# Patient Record
Sex: FEMALE | Race: ASIAN | Employment: OTHER | URBAN - METROPOLITAN AREA
[De-identification: names, ages, dates, MRNs, and addresses within clinical notes are randomized per-mention and may not be internally consistent; named-entity substitution may affect disease eponyms.]

---

## 2017-01-04 ENCOUNTER — HOSPITAL ENCOUNTER (OUTPATIENT)
Dept: MAMMOGRAPHY | Age: 62
Discharge: HOME | End: 2017-01-04
Attending: INTERNAL MEDICINE
Payer: SELF-PAY

## 2017-01-04 DIAGNOSIS — Z12.31 VISIT FOR SCREENING MAMMOGRAM: ICD-10-CM

## 2017-01-04 PROCEDURE — 77067 SCR MAMMO BI INCL CAD: CPT

## 2017-09-25 ENCOUNTER — CLINICAL SUPPORT (OUTPATIENT)
Dept: FAMILY MEDICINE CLINIC | Age: 62
End: 2017-09-25

## 2017-09-25 DIAGNOSIS — Z23 ENCOUNTER FOR IMMUNIZATION: Primary | ICD-10-CM

## 2017-09-25 NOTE — PATIENT INSTRUCTIONS
Vaccine Information Statement    Influenza (Flu) Vaccine (Inactivated or Recombinant): What you need to know    Many Vaccine Information Statements are available in [place] and other languages. See www.immunize.org/vis  Hojas de Información Sobre Vacunas están disponibles en Español y en muchos otros idiomas. Visite www.immunize.org/vis    1. Why get vaccinated? Influenza (flu) is a contagious disease that spreads around the [place] every year, usually between October and May. Flu is caused by influenza viruses, and is spread mainly by coughing, sneezing, and close contact. Anyone can get flu. Flu strikes suddenly and can last several days. Symptoms vary by age, but can include:   fever/chills   sore throat   muscle aches   fatigue   cough   headache    runny or stuffy nose    Flu can also lead to pneumonia and blood infections, and cause diarrhea and seizures in children. If you have a medical condition, such as heart or lung disease, flu can make it worse. Flu is more dangerous for some people. Infants and young children, people 72years of age and older, pregnant women, and people with certain health conditions or a weakened immune system are at greatest risk. Each year thousands of people in the [facility] die from flu, and many more are hospitalized. Flu vaccine can:   keep you from getting flu,   make flu less severe if you do get it, and   keep you from spreading flu to your family and other people. 2. Inactivated and recombinant flu vaccines    A dose of flu vaccine is recommended every flu season. Children 6 months through 6years of age may need two doses during the same flu season. Everyone else needs only one dose each flu season.        Some inactivated flu vaccines contain a very small amount of a mercury-based preservative called thimerosal. Studies have not shown thimerosal in vaccines to be harmful, but flu vaccines that do not contain thimerosal are available. There is no live flu virus in flu shots. They cannot cause the flu. There are many flu viruses, and they are always changing. Each year a new flu vaccine is made to protect against three or four viruses that are likely to cause disease in the upcoming flu season. But even when the vaccine doesnt exactly match these viruses, it may still provide some protection    Flu vaccine cannot prevent:   flu that is caused by a virus not covered by the vaccine, or   illnesses that look like flu but are not. It takes about 2 weeks for protection to develop after vaccination, and protection lasts through the flu season. 3. Some people should not get this vaccine    Tell the person who is giving you the vaccine:     If you have any severe, life-threatening allergies. If you ever had a life-threatening allergic reaction after a dose of flu vaccine, or have a severe allergy to any part of this vaccine, you may be advised not to get vaccinated. Most, but not all, types of flu vaccine contain a small amount of egg protein.  If you ever had Guillain-Barré Syndrome (also called GBS). Some people with a history of GBS should not get this vaccine. This should be discussed with your doctor.  If you are not feeling well. It is usually okay to get flu vaccine when you have a mild illness, but you might be asked to come back when you feel better. 4. Risks of a vaccine reaction    With any medicine, including vaccines, there is a chance of reactions. These are usually mild and go away on their own, but serious reactions are also possible. Most people who get a flu shot do not have any problems with it.      Minor problems following a flu shot include:    soreness, redness, or swelling where the shot was given     hoarseness   sore, red or itchy eyes   cough   fever   aches   headache   itching   fatigue  If these problems occur, they usually begin soon after the shot and last 1 or 2 days. More serious problems following a flu shot can include the following:     There may be a small increased risk of Guillain-Barré Syndrome (GBS) after inactivated flu vaccine. This risk has been estimated at 1 or 2 additional cases per million people vaccinated. This is much lower than the risk of severe complications from flu, which can be prevented by flu vaccine.  Young children who get the flu shot along with pneumococcal vaccine (PCV13) and/or DTaP vaccine at the same time might be slightly more likely to have a seizure caused by fever. Ask your doctor for more information. Tell your doctor if a child who is getting flu vaccine has ever had a seizure. Problems that could happen after any injected vaccine:      People sometimes faint after a medical procedure, including vaccination. Sitting or lying down for about 15 minutes can help prevent fainting, and injuries caused by a fall. Tell your doctor if you feel dizzy, or have vision changes or ringing in the ears.  Some people get severe pain in the shoulder and have difficulty moving the arm where a shot was given. This happens very rarely.  Any medication can cause a severe allergic reaction. Such reactions from a vaccine are very rare, estimated at about 1 in a million doses, and would happen within a few minutes to a few hours after the vaccination. As with any medicine, there is a very remote chance of a vaccine causing a serious injury or death. The safety of vaccines is always being monitored. For more information, visit: www.cdc.gov/vaccinesafety/    5. What if there is a serious reaction? What should I look for?  Look for anything that concerns you, such as signs of a severe allergic reaction, very high fever, or unusual behavior.     Signs of a severe allergic reaction can include hives, swelling of the face and throat, difficulty breathing, a fast heartbeat, dizziness, and weakness - usually within a few minutes to a few hours after the vaccination. What should I do?  If you think it is a severe allergic reaction or other emergency that cant wait, call 9-1-1 and get the person to the nearest hospital. Otherwise, call your doctor.  Reactions should be reported to the Vaccine Adverse Event Reporting System (VAERS). Your doctor should file this report, or you can do it yourself through  the VAERS web site at www.vaers. [facility].gov, or by calling 3-670.760.5883. VAERS does not give medical advice. 6. The National Vaccine Injury Compensation Program    The [facility] Vaccine Injury Compensation Program (VICP) is a federal program that was created to compensate people who may have been injured by certain vaccines. Persons who believe they may have been injured by a vaccine can learn about the program and about filing a claim by calling 1-843.457.2863 or visiting the [employer] website at www.[facility].gov/vaccinecompensation. There is a time limit to file a claim for compensation. 7. How can I learn more?  Ask your healthcare provider. He or she can give you the vaccine package insert or suggest other sources of information.  Call your local or state health department.  Contact the Centers for Disease Control and Prevention (CDC):  - Call 6-531.630.5164 (1-800-CDC-INFO) or  - Visit CDCs website at www.cdc.gov/flu    Vaccine Information Statement   Inactivated Influenza Vaccine   8/7/2015  42 ASPEN Ness [id number]    Department of Health and Human Services  Centers for Disease Control and Prevention    Office Use Only

## 2017-09-28 ENCOUNTER — OFFICE VISIT (OUTPATIENT)
Dept: FAMILY MEDICINE CLINIC | Age: 62
End: 2017-09-28

## 2017-09-28 VITALS
RESPIRATION RATE: 20 BRPM | HEIGHT: 64 IN | BODY MASS INDEX: 23.9 KG/M2 | HEART RATE: 62 BPM | DIASTOLIC BLOOD PRESSURE: 82 MMHG | OXYGEN SATURATION: 96 % | TEMPERATURE: 96.7 F | SYSTOLIC BLOOD PRESSURE: 139 MMHG | WEIGHT: 140 LBS

## 2017-09-28 DIAGNOSIS — Z76.89 ENCOUNTER TO ESTABLISH CARE: Primary | ICD-10-CM

## 2017-09-28 DIAGNOSIS — Z12.11 COLON CANCER SCREENING: ICD-10-CM

## 2017-09-28 DIAGNOSIS — Z23 NEED FOR SHINGLES VACCINE: ICD-10-CM

## 2017-09-28 DIAGNOSIS — Z23 ENCOUNTER FOR IMMUNIZATION: ICD-10-CM

## 2017-09-28 LAB
BACTERIA UA POCT, BACTPOCT: NORMAL
BILIRUB UR QL STRIP: NEGATIVE
CASTS UA POCT: NEGATIVE
CLUE CELLS, CLUEPOCT: NEGATIVE
CRYSTALS UA POCT, CRYSPOCT: NEGATIVE
EPITHELIAL CELLS POCT: NORMAL
GLUCOSE UR-MCNC: NEGATIVE MG/DL
KETONES P FAST UR STRIP-MCNC: NEGATIVE MG/DL
MUCUS UA POCT, MUCPOCT: NORMAL
PH UR STRIP: 7 [PH] (ref 4.6–8)
PROT UR QL STRIP: NEGATIVE MG/DL
RBC UA POCT, RBCPOCT: 0
SP GR UR STRIP: 1.02 (ref 1–1.03)
TRICH UA POCT, TRICHPOC: NEGATIVE
UA UROBILINOGEN AMB POC: NORMAL (ref 0.2–1)
URINALYSIS CLARITY POC: CLEAR
URINALYSIS COLOR POC: YELLOW
URINE BLOOD POC: NEGATIVE
URINE CULT COMMENT, POCT: NORMAL
URINE LEUKOCYTES POC: NEGATIVE
URINE NITRITES POC: NEGATIVE
WBC UA POCT, WBCPOCT: NORMAL
YEAST UA POCT, YEASTPOC: NEGATIVE

## 2017-09-28 NOTE — PATIENT INSTRUCTIONS
Vaccine Information Statement     Tdap (Tetanus, Diphtheria, Pertussis) Vaccine: What You Need to Know    Many Vaccine Information Statements are available in Croatian and other languages. See www.immunize.org/vis. Hojas de Información Sobre Vacunas están disponibles en español y en muchos otros idiomas. Visite [email or web address]    1. Why get vaccinated? Tetanus, diphtheria, and pertussis are very serious diseases. Tdap vaccine can protect us from these diseases. And, Tdap vaccine given to pregnant women can protect  babies against pertussis. TETANUS (Lockjaw) is rare in the [facility] today. It causes painful muscle tightening and stiffness, usually all over the body.  It can lead to tightening of muscles in the head and neck so you cant open your mouth, swallow, or sometimes even breathe. Tetanus kills about 1 out of 10 people who are infected even after receiving the best medical care. DIPHTHERIA is also rare in the [facility] today. It can cause a thick coating to form in the back of the throat.  It can lead to breathing problems, heart failure, paralysis, and death. PERTUSSIS (Whooping Cough) causes severe coughing spells, which can cause difficulty breathing, vomiting, and disturbed sleep.  It can also lead to weight loss, incontinence, and rib fractures. Up to 2 in 100 adolescents and 5 in 100 adults with pertussis are hospitalized or have complications, which could include pneumonia or death. These diseases are caused by bacteria. Diphtheria and pertussis are spread from person to person through secretions from coughing or sneezing. Tetanus enters the body through cuts, scratches, or wounds. Before vaccines, as many as 200,000 cases of diphtheria, 200,000 cases of pertussis, and hundreds of cases of tetanus, were reported in the [place] each year.  Since vaccination began, reports of cases for tetanus and diphtheria have dropped by about 99% and for pertussis by about 80%. 2. Tdap vaccine    Tdap vaccine can protect adolescents and adults from tetanus, diphtheria, and pertussis. One dose of Tdap is routinely given at age 6 or 15. People who did not get Tdap at that age should get it as soon as possible. Tdap is especially important for health care professionals and anyone having close contact with a baby younger than 12 months. Pregnant women should get a dose of Tdap during every pregnancy, to protect the  from pertussis. Infants are most at risk for severe, life-threatening complications from pertussis. Another vaccine, called Td, protects against tetanus and diphtheria, but not pertussis. A Td booster should be given every 10 years. Tdap may be given as one of these boosters if you have never gotten Tdap before. Tdap may also be given after a severe cut or burn to prevent tetanus infection. Your doctor or the person giving you the vaccine can give you more information. Tdap may safely be given at the same time as other vaccines. 3. Some people should not get this vaccine     A person who has ever had a life-threatening allergic reaction after a previous dose of any diphtheria, tetanus or pertussis containing vaccine, OR has a severe allergy to any part of this vaccine, should not get Tdap vaccine. Tell the person giving the vaccine about any severe allergies.  Anyone who had coma or long repeated seizures within 7 days after a childhood dose of DTP or DTaP, or a previous dose of Tdap, should not get Tdap, unless a cause other than the vaccine was found. They can still get Td.  Talk to your doctor if you:  - have seizures or another nervous system problem,  - had severe pain or swelling after any vaccine containing diphtheria, tetanus or pertussis,   - ever had a condition called Guillain Barré Syndrome (GBS),  - arent feeling well on the day the shot is scheduled.     4. Risks    With any medicine, including vaccines, there is a chance of side effects. These are usually mild and go away on their own. Serious reactions are also possible but are rare. Most people who get Tdap vaccine do not have any problems with it. Mild Problems following Tdap  (Did not interfere with activities)   Pain where the shot was given (about 3 in 4 adolescents or 2 in 3 adults)   Redness or swelling where the shot was given (about 1 person in 5)   Mild fever of at least 100.4°F (up to about 1 in 25 adolescents or 1 in 100 adults)   Headache (about 3 or 4 people in 10)   Tiredness (about 1 person in 3 or 4)   Nausea, vomiting, diarrhea, stomach ache (up to 1 in 4 adolescents or 1 in 10 adults)   Chills,  sore joints (about 1 person in 10)   Body aches (about 1 person in 3 or 4)    Rash, swollen glands (uncommon)    Moderate Problems following Tdap  (Interfered with activities, but did not require medical attention)   Pain where the shot was given (up to 1 in 5 or 6)    Redness or swelling where the shot was given (up to about 1 in 16 adolescents or 1 in 12 adults)   Fever over 102°F (about 1 in 100 adolescents or 1 in 250 adults)   Headache (about 1 in 7 adolescents or 1 in 10 adults)   Nausea, vomiting, diarrhea, stomach ache (up to 1 or 3 people in 100)   Swelling of the entire arm where the shot was given (up to about 1 in 500). Severe Problems following Tdap  (Unable to perform usual activities; required medical attention)   Swelling, severe pain, bleeding, and redness in the arm where the shot was given (rare). Problems that could happen after any vaccine:     People sometimes faint after a medical procedure, including vaccination. Sitting or lying down for about 15 minutes can help prevent fainting, and injuries caused by a fall. Tell your doctor if you feel dizzy, or have vision changes or ringing in the ears.      Some people get severe pain in the shoulder and have difficulty moving the arm where a shot was given. This happens very rarely.  Any medication can cause a severe allergic reaction. Such reactions from a vaccine are very rare, estimated at fewer than 1 in a million doses, and would happen within a few minutes to a few hours after the vaccination. As with any medicine, there is a very remote chance of a vaccine causing a serious injury or death. The safety of vaccines is always being monitored. For more information, visit: www.cdc.gov/vaccinesafety/    5. What if there is a serious problem? What should I look for?  Look for anything that concerns you, such as signs of a severe allergic reaction, very high fever, or unusual behavior.  Signs of a severe allergic reaction can include hives, swelling of the face and throat, difficulty breathing, a fast heartbeat, dizziness, and weakness. These would usually start a few minutes to a few hours after the vaccination. What should I do?  If you think it is a severe allergic reaction or other emergency that cant wait, call 9-1-1 or get the person to the nearest hospital. Otherwise, call your doctor.  Afterward, the reaction should be reported to the Vaccine Adverse Event Reporting System (VAERS). Your doctor might file this report, or you can do it yourself through the VAERS web site at www.vaers. [facility].gov, or by calling 8-399.346.4862. VAERS does not give medical advice. 6. The National Vaccine Injury Compensation Program    The [facility] Vaccine Injury Compensation Program (VICP) is a federal program that was created to compensate people who may have been injured by certain vaccines. Persons who believe they may have been injured by a vaccine can learn about the program and about filing a claim by calling 6-641.635.3407 or visiting the [employer] website at www.[facility].gov/vaccinecompensation. There is a time limit to file a claim for compensation. 7. How can I learn more?  Ask your doctor.  He or she can give you the vaccine package insert or suggest other sources of information.  Call your local or state health department.  Contact the Centers for Disease Control and Prevention (CDC):  - Call 3-760.664.3951 (1-800-CDC-INFO) or  - Visit CDCs website at www.cdc.gov/vaccines      Vaccine Information Statement   Tdap Vaccine  (2/24/2015)  42 ASPEN Demarco [id number]    Department of Health and Human Services  Centers for Disease Control and Prevention    Office Use Only

## 2017-09-28 NOTE — MR AVS SNAPSHOT
Visit Information Date & Time Provider Department Dept. Phone Encounter #  
 9/28/2017  8:30 AM Skyler Ly NP [facility] [phone] [id number] Follow-up Instructions Return in about 1 year (around 9/28/2018). Upcoming Health Maintenance Date Due COLONOSCOPY 11/21/1974 DTaP/Tdap/Td series (1 - Tdap) 11/21/1977 PAP AKA CERVICAL CYTOLOGY 11/21/1977 ZOSTER VACCINE AGE 60> 9/21/2016 BREAST CANCER SCRN MAMMOGRAM 1/4/2019 Allergies as of 9/28/2017  Review Complete On: 9/28/2017 By: Pam Berumen LPN No Known Allergies Current Immunizations  Never Reviewed Name Date Influenza Vaccine (Quad) PF 9/25/2017  8:25 AM, 10/23/2015 Not reviewed this visit You Were Diagnosed With   
  
 Codes Comments Encounter to establish care    -  Primary ICD-10-CM: Z76.89 
ICD-9-CM: V65.8 Colon cancer screening     ICD-10-CM: Z12.11 ICD-9-CM: V76.51 Need for shingles vaccine     ICD-10-CM: N09 ICD-9-CM: V04.89 Vitals BP Pulse Temp Resp Height(growth percentile) Weight(growth percentile) 139/82 62 96.7 °F (35.9 °C) (Oral) 20 5' 4\" (1.626 m) 140 lb (63.5 kg) LMP SpO2 BMI OB Status Smoking Status (LMP Unknown) 96% 24.03 kg/m2 Postmenopausal Never Smoker Vitals History BMI and BSA Data Body Mass Index Body Surface Area 24.03 kg/m 2 1.69 m 2 Preferred Pharmacy Pharmacy Name Phone [id number]- [place], [place] - [address] [phone] Your Updated Medication List  
  
   
This list is accurate as of: 9/28/17  9:29 AM.  Always use your most recent med list.  
  
  
  
  
 AZASITE 1 % ophthalmic solution Generic drug:  azithromycin  
  
 b complex vitamins tablet Take 1 Tab by mouth daily. CENTRUM SILVER WOMEN PO Take  by mouth. ESTROVEN MAXIMUM STRENGTH 400 mcg Tab Generic drug:  Mv,Ca,Min-FA-Herbal No.157  
 Take  by mouth. FISH OIL OMEGA 3-6-9 PO Take  by mouth. fluorometholone 0.1 % ophthalmic suspension Commonly known as: FML  
  
 GLUCOSAMINE 1500 COMPLEX PO Take  by mouth. HAIR, SKIN AND NAILS-ARGAN OIL 66.7-1,666.7 mcg Cap Generic drug:  mv,iron,min-folic acid-biotin Take  by mouth. ONE DAILY WOMEN'S METABOLISM PO Take  by mouth.  
  
 polycarbophil calcium 500 mg 308 [facility] Commonly known as:  [place] Olp Take 1,000 mg by mouth daily. prednisoLONE acetate 1 % ophthalmic suspension Commonly known as:  PRED FORTE We Performed the Following AMB POC URINALYSIS DIP STICK AUTO W/ MICRO  [99753 CPT(R)] CBC WITH AUTOMATED DIFF [71589 CPT(R)] LIPID PANEL [34432 CPT(R)] METABOLIC PANEL, COMPREHENSIVE [87787 CPT(R)] REFERRAL FOR COLONOSCOPY [[id number] Custom] Comments:  
 Please evaluate patient for colon cancer screening. Prefers Dr. Nathanael Bernheim VZV AB, IGG [id number] CPT(R)] Follow-up Instructions Return in about 1 year (around 9/28/2018). Referral Information Referral ID Referred By Referred To  
  
 [id number] Nabil DOMINGO Not Available Visits Status Start Date End Date 1 New Request 9/28/17 9/28/18 If your referral has a status of pending review or denied, additional information will be sent to support the outcome of this decision. Introducing [facility]! [employer] introduces [facility] patient portal. Now you can access parts of your medical record, email your doctor's office, and request medication refills online. 1. In your internet browser, go to [email or web address] 2. Click on the First Time User? Click Here link in the Sign In box. You will see the New Member Sign Up page. 3. Enter your [facility] Access Code exactly as it appears below. You will not need to use this code after youve completed the sign-up process.  If you do not sign up before the expiration date, you must request a new code. · [employer] Access Code: [email or web address] Expires: 12/27/2017  9:29 AM 
 
4. Enter the last four digits of your Social Security Number (xxxx) and Date of Birth (mm/dd/yyyy) as indicated and click Submit. You will be taken to the next sign-up page. 5. Create a [employer] ID. This will be your [employer] login ID and cannot be changed, so think of one that is secure and easy to remember. 6. Create a [employer] password. You can change your password at any time. 7. Enter your Password Reset Question and Answer. This can be used at a later time if you forget your password. 8. Enter your e-mail address. You will receive e-mail notification when new information is available in [address] 9. Click Sign Up. You can now view and download portions of your medical record. 10. Click the Download Summary menu link to download a portable copy of your medical information. If you have questions, please visit the Frequently Asked Questions section of the [employer] website. Remember, [employer] is NOT to be used for urgent needs. For medical emergencies, dial 911. Now available from your iPhone and Android! Please provide this summary of care documentation to your next provider. Your primary care clinician is listed as Donell Roberson. If you have any questions after today's visit, please call [phone].

## 2017-09-28 NOTE — PROGRESS NOTES
HISTORY OF PRESENT ILLNESS  Karyna Farrar is a 62 y.o. female. HPI  Patient comes in today to establish care. Hx elevated cholesterol in 2015. States she walks on treadmill 5 days per week for 1 hour. Patient states she loves butter, pork belly fat. Has tried to follow low cholesterol diet recently. Had a colonoscopy in [place] 2-3 years ago, but does not have a report. Would like to have a colonoscopy scheduled here with Dr. Liliya Tafoya. Had Lasik eye surgery twice. States vision continues to change over time. States eyes getting very dry. Saw Dr. Vandana Guerrero. Had surgery to increase tears in eyes, but only helped for short period of time. No Known Allergies    Past Medical History:   Diagnosis Date    Hyperlipidemia        No past surgical history on file. Social History     Social History    Marital status:      Spouse name: N/A    Number of children: N/A    Years of education: N/A     Occupational History    Not on file. Social History Main Topics    Smoking status: Never Smoker    Smokeless tobacco: Never Used    Alcohol use No    Drug use: No    Sexual activity: Yes     Partners: Female     Other Topics Concern    Not on file     Social History Narrative       Family History   Problem Relation Age of Onset    Breast Cancer Sister        Current Outpatient Prescriptions   Medication Sig    MULTIVITS-MIN/IRON/FA/LUTEIN (CENTRUM SILVER WOMEN PO) Take  by mouth.  fluorometholone (FML) 0.1 % ophthalmic suspension     prednisoLONE acetate (PRED FORTE) 1 % ophthalmic suspension     b complex vitamins tablet Take 1 Tab by mouth daily.  AZASITE 1 % ophthalmic solution     mv,iron,min-folic acid-biotin (HAIR, SKIN & NAILS- ARGAN OIL) 66.7-1,666.7 mcg cap Take  by mouth.  MV,CA,FE,MIN/FA/GUARANA/CAFF (ONE DAILY WOMEN'S METABOLISM PO) Take  by mouth.  Mv,Ca,Min-FA-Herbal No.157 (ESTROVEN MAXIMUM STRENGTH) 400 mcg tab Take  by mouth.     GLUC LOPEZ/CHONDRO LOPEZ A/VIT C/MN (GLUCOSAMINE 1500 COMPLEX PO) Take  by mouth.  polycarbophil calcium (EQUALACTIN) 500 mg chew Take 1,000 mg by mouth daily.  OMEGA-3 FATTY ACIDS/FISH OIL (FISH OIL OMEGA 3-6-9 PO) Take  by mouth. No current facility-administered medications for this visit. Review of Systems   Constitutional: Negative for chills, diaphoresis, fever, malaise/fatigue and weight loss. HENT: Negative for congestion, ear pain, sore throat and tinnitus. Eyes: Negative for blurred vision and double vision. Respiratory: Negative for cough, sputum production, shortness of breath and wheezing. Cardiovascular: Negative for chest pain, palpitations and leg swelling. Gastrointestinal: Negative for abdominal pain, blood in stool, constipation, diarrhea, nausea and vomiting. Genitourinary: Negative for dysuria, flank pain, frequency, hematuria and urgency. Musculoskeletal: Negative for back pain, joint pain and myalgias. Skin: Negative. Neurological: Negative for dizziness, tingling, sensory change, speech change, focal weakness and headaches. Psychiatric/Behavioral: Negative for depression. The patient is not nervous/anxious and does not have insomnia. Vitals:    09/28/17 0901   BP: 139/82   Pulse: 62   Resp: 20   Temp: 96.7 °F (35.9 °C)   TempSrc: Oral   SpO2: 96%   Weight: 140 lb (63.5 kg)   Height: 5' 4\" (1.626 m)     Physical Exam   Constitutional: She is oriented to person, place, and time. Vital signs are normal. She appears well-developed and well-nourished. She is cooperative. HENT:   Right Ear: Hearing, tympanic membrane, external ear and ear canal normal.   Left Ear: Hearing, tympanic membrane, external ear and ear canal normal.   Nose: Nose normal. Right sinus exhibits no maxillary sinus tenderness and no frontal sinus tenderness. Left sinus exhibits no maxillary sinus tenderness and no frontal sinus tenderness.    Mouth/Throat: Uvula is midline, oropharynx is clear and moist and mucous membranes are normal. Mucous membranes are not pale and not dry. No oropharyngeal exudate, posterior oropharyngeal edema or posterior oropharyngeal erythema. Neck: No thyroid mass and no thyromegaly present. Cardiovascular: Normal rate, regular rhythm, S1 normal, S2 normal and normal heart sounds. No murmur heard. Pulses:       Radial pulses are 2+ on the right side, and 2+ on the left side. Dorsalis pedis pulses are 2+ on the right side, and 2+ on the left side. Posterior tibial pulses are 2+ on the right side, and 2+ on the left side. Pulmonary/Chest: Effort normal and breath sounds normal. She has no decreased breath sounds. She has no wheezes. She has no rhonchi. She has no rales. Abdominal: Soft. Normal appearance and bowel sounds are normal. There is no hepatosplenomegaly. There is no tenderness. There is no CVA tenderness. Lymphadenopathy:        Head (right side): No submental, no submandibular, no tonsillar, no preauricular and no posterior auricular adenopathy present. Head (left side): No submental, no submandibular, no tonsillar, no preauricular and no posterior auricular adenopathy present. She has no cervical adenopathy. Right: No supraclavicular adenopathy present. Left: No supraclavicular adenopathy present. Neurological: She is alert and oriented to person, place, and time. Skin: Skin is warm, dry and intact. Psychiatric: She has a normal mood and affect. Her speech is normal and behavior is normal. Thought content normal.   Vitals reviewed. ASSESSMENT and PLAN    ICD-10-CM ICD-9-CM    1. Encounter to establish care Z76.89 V65.8 CBC WITH AUTOMATED DIFF      METABOLIC PANEL, COMPREHENSIVE      LIPID PANEL      AMB POC URINALYSIS DIP STICK AUTO W/ MICRO    2. Colon cancer screening Z12.11 V76.51 REFERRAL FOR COLONOSCOPY   3. Need for shingles vaccine Z23 V04.89 VZV AB, IGG   4.  Encounter for immunization Z23 V03.89 TETANUS, DIPHTHERIA TOXOIDS AND ACELLULAR PERTUSSIS VACCINE (TDAP), IN INDIVIDS. >=7, IM     Encounter Diagnoses   Name Primary?  Encounter to establish care Yes    Colon cancer screening     Need for shingles vaccine     Encounter for immunization      Orders Placed This Encounter    Tetanus, diphtheria toxoids and acellular pertussis (TDAP) vaccine, in individuals >=7 years, IM    CBC WITH AUTOMATED DIFF    METABOLIC PANEL, COMPREHENSIVE    LIPID PANEL    VZV AB, IGG    REFERRAL FOR COLONOSCOPY    AMB POC URINALYSIS DIP STICK AUTO W/ MICRO      Diagnoses and all orders for this visit:    1. Encounter to establish care  -     CBC WITH AUTOMATED DIFF  -     METABOLIC PANEL, COMPREHENSIVE  -     LIPID PANEL  -     AMB POC URINALYSIS DIP STICK AUTO W/ MICRO     2. Colon cancer screening  -     REFERRAL FOR COLONOSCOPY    3. Need for shingles vaccine  -     VZV AB, IGG    4. Encounter for immunization  -     Tetanus, diphtheria toxoids and acellular pertussis (TDAP) vaccine, in individuals >=7 years, IM      Follow-up Disposition:  Return in about 1 year (around 9/28/2018). lab results and schedule of future lab studies reviewed with patient  reviewed diet, exercise and weight control  cardiovascular risk and specific lipid/LDL goals reviewed    I have reviewed the patient's allergies and made any necessary changes. Medical, procedural, social and family histories have been reviewed and updated as medically indicated. I have reconciled and/or revised patient medications in the EMR. I have discussed each diagnosis listed in this note with Baron Cowart and/or their family. I have discussed treatment options and the risk/benefit analysis of those options, including safe use of medications and possible medication side effects. Through the use of shared decision making we have agreed to the above plan. The patient has received an after-visit summary and questions were answered concerning future plans.      Luz Elena GUY Boogie, FNP-C    This note will not be viewable in Visst.

## 2017-09-29 LAB
ALBUMIN SERPL-MCNC: 4.3 G/DL (ref 3.6–4.8)
ALBUMIN/GLOB SERPL: 1.4 {RATIO} (ref 1.2–2.2)
ALP SERPL-CCNC: 70 IU/L (ref 39–117)
ALT SERPL-CCNC: 22 IU/L (ref 0–32)
AST SERPL-CCNC: 36 IU/L (ref 0–40)
BASOPHILS # BLD AUTO: 0 X10E3/UL (ref 0–0.2)
BASOPHILS NFR BLD AUTO: 1 %
BILIRUB SERPL-MCNC: 0.4 MG/DL (ref 0–1.2)
BUN SERPL-MCNC: 15 MG/DL (ref 8–27)
BUN/CREAT SERPL: 21 (ref 12–28)
CALCIUM SERPL-MCNC: 9.3 MG/DL (ref 8.7–10.3)
CHLORIDE SERPL-SCNC: 99 MMOL/L (ref 96–106)
CHOLEST SERPL-MCNC: 280 MG/DL (ref 100–199)
CO2 SERPL-SCNC: 26 MMOL/L (ref 18–29)
CREAT SERPL-MCNC: 0.71 MG/DL (ref 0.57–1)
EOSINOPHIL # BLD AUTO: 0.2 X10E3/UL (ref 0–0.4)
EOSINOPHIL NFR BLD AUTO: 5 %
ERYTHROCYTE [DISTWIDTH] IN BLOOD BY AUTOMATED COUNT: 14.5 % (ref 12.3–15.4)
GLOBULIN SER CALC-MCNC: 3 G/DL (ref 1.5–4.5)
GLUCOSE SERPL-MCNC: 98 MG/DL (ref 65–99)
HCT VFR BLD AUTO: 39.1 % (ref 34–46.6)
HDLC SERPL-MCNC: 126 MG/DL
HGB BLD-MCNC: 13.6 G/DL (ref 11.1–15.9)
IMM GRANULOCYTES # BLD: 0 X10E3/UL (ref 0–0.1)
IMM GRANULOCYTES NFR BLD: 0 %
INTERPRETATION, 910389: NORMAL
LDLC SERPL CALC-MCNC: 132 MG/DL (ref 0–99)
LYMPHOCYTES # BLD AUTO: 1.3 X10E3/UL (ref 0.7–3.1)
LYMPHOCYTES NFR BLD AUTO: 37 %
MCH RBC QN AUTO: 31.4 PG (ref 26.6–33)
MCHC RBC AUTO-ENTMCNC: 34.8 G/DL (ref 31.5–35.7)
MCV RBC AUTO: 90 FL (ref 79–97)
MONOCYTES # BLD AUTO: 0.3 X10E3/UL (ref 0.1–0.9)
MONOCYTES NFR BLD AUTO: 8 %
NEUTROPHILS # BLD AUTO: 1.8 X10E3/UL (ref 1.4–7)
NEUTROPHILS NFR BLD AUTO: 49 %
PLATELET # BLD AUTO: 255 X10E3/UL (ref 150–379)
POTASSIUM SERPL-SCNC: 3.8 MMOL/L (ref 3.5–5.2)
PROT SERPL-MCNC: 7.3 G/DL (ref 6–8.5)
RBC # BLD AUTO: 4.33 X10E6/UL (ref 3.77–5.28)
SODIUM SERPL-SCNC: 139 MMOL/L (ref 134–144)
TRIGL SERPL-MCNC: 111 MG/DL (ref 0–149)
VLDLC SERPL CALC-MCNC: 22 MG/DL (ref 5–40)
VZV IGG SER IA-ACNC: 634 INDEX
WBC # BLD AUTO: 3.6 X10E3/UL (ref 3.4–10.8)

## 2017-10-04 DIAGNOSIS — Z23 NEED FOR SHINGLES VACCINE: Primary | ICD-10-CM

## 2023-04-06 ENCOUNTER — TRANSCRIBE ORDER (OUTPATIENT)
Dept: SCHEDULING | Age: 68
End: 2023-04-06

## 2023-04-25 ENCOUNTER — OFFICE VISIT (OUTPATIENT)
Dept: FAMILY MEDICINE CLINIC | Age: 68
End: 2023-04-25
Payer: MEDICARE

## 2023-04-25 VITALS
HEIGHT: 63 IN | OXYGEN SATURATION: 97 % | HEART RATE: 69 BPM | RESPIRATION RATE: 12 BRPM | BODY MASS INDEX: 24.73 KG/M2 | DIASTOLIC BLOOD PRESSURE: 86 MMHG | SYSTOLIC BLOOD PRESSURE: 136 MMHG | TEMPERATURE: 98.1 F | WEIGHT: 139.6 LBS

## 2023-04-25 DIAGNOSIS — Z76.89 ENCOUNTER TO ESTABLISH CARE WITH NEW DOCTOR: ICD-10-CM

## 2023-04-25 DIAGNOSIS — Z00.00 MEDICARE ANNUAL WELLNESS VISIT, SUBSEQUENT: Primary | ICD-10-CM

## 2023-04-25 DIAGNOSIS — M54.17 SPINAL STENOSIS OF LUMBOSACRAL REGION WITH RADICULOPATHY: ICD-10-CM

## 2023-04-25 DIAGNOSIS — Z23 ENCOUNTER FOR IMMUNIZATION: ICD-10-CM

## 2023-04-25 DIAGNOSIS — Z13.820 SCREENING FOR OSTEOPOROSIS: ICD-10-CM

## 2023-04-25 DIAGNOSIS — Z13.220 SCREENING, LIPID: ICD-10-CM

## 2023-04-25 DIAGNOSIS — M48.07 SPINAL STENOSIS OF LUMBOSACRAL REGION WITH RADICULOPATHY: ICD-10-CM

## 2023-04-25 DIAGNOSIS — Z12.31 ENCOUNTER FOR SCREENING MAMMOGRAM FOR BREAST CANCER: ICD-10-CM

## 2023-04-25 DIAGNOSIS — Z78.0 POSTMENOPAUSAL: ICD-10-CM

## 2023-04-25 DIAGNOSIS — R35.1 NOCTURIA: ICD-10-CM

## 2023-04-25 PROCEDURE — G0009 ADMIN PNEUMOCOCCAL VACCINE: HCPCS | Performed by: FAMILY MEDICINE

## 2023-04-25 PROCEDURE — G0439 PPPS, SUBSEQ VISIT: HCPCS | Performed by: FAMILY MEDICINE

## 2023-04-25 PROCEDURE — 99203 OFFICE O/P NEW LOW 30 MIN: CPT | Performed by: FAMILY MEDICINE

## 2023-04-25 PROCEDURE — G8400 PT W/DXA NO RESULTS DOC: HCPCS | Performed by: FAMILY MEDICINE

## 2023-04-25 PROCEDURE — 3017F COLORECTAL CA SCREEN DOC REV: CPT | Performed by: FAMILY MEDICINE

## 2023-04-25 PROCEDURE — 1123F ACP DISCUSS/DSCN MKR DOCD: CPT | Performed by: FAMILY MEDICINE

## 2023-04-25 PROCEDURE — 1101F PT FALLS ASSESS-DOCD LE1/YR: CPT | Performed by: FAMILY MEDICINE

## 2023-04-25 PROCEDURE — G8536 NO DOC ELDER MAL SCRN: HCPCS | Performed by: FAMILY MEDICINE

## 2023-04-25 PROCEDURE — 90677 PCV20 VACCINE IM: CPT | Performed by: FAMILY MEDICINE

## 2023-04-25 PROCEDURE — 1090F PRES/ABSN URINE INCON ASSESS: CPT | Performed by: FAMILY MEDICINE

## 2023-04-25 PROCEDURE — G8420 CALC BMI NORM PARAMETERS: HCPCS | Performed by: FAMILY MEDICINE

## 2023-04-25 PROCEDURE — G8427 DOCREV CUR MEDS BY ELIG CLIN: HCPCS | Performed by: FAMILY MEDICINE

## 2023-04-25 PROCEDURE — G9899 SCRN MAM PERF RSLTS DOC: HCPCS | Performed by: FAMILY MEDICINE

## 2023-04-25 PROCEDURE — G8510 SCR DEP NEG, NO PLAN REQD: HCPCS | Performed by: FAMILY MEDICINE

## 2023-04-25 RX ORDER — DICLOFENAC SODIUM 75 MG/1
75 TABLET, DELAYED RELEASE ORAL 2 TIMES DAILY
Qty: 60 TABLET | Refills: 0 | COMMUNITY
Start: 2023-04-05 | End: 2023-05-05

## 2023-04-25 RX ORDER — METHYLPREDNISOLONE 4 MG/1
TABLET ORAL
Qty: 1 DOSE PACK | Refills: 0 | Status: SHIPPED | OUTPATIENT
Start: 2023-04-25

## 2023-04-25 RX ORDER — GABAPENTIN 100 MG/1
100 CAPSULE ORAL 2 TIMES DAILY
Qty: 60 CAPSULE | Refills: 1 | Status: SHIPPED | OUTPATIENT
Start: 2023-04-25

## 2023-04-25 NOTE — PROGRESS NOTES
Patient identified by 2 identifiers. Chief Complaint   Patient presents with    Establish Care    Hip Pain     1. Have you been to the ER, urgent care clinic since your last visit? Hospitalized since your last visit? No    2. Have you seen or consulted any other health care providers outside of the [address] [place] [address] since your last visit? Include any pap smears or colon screening. No    Verbal Order with Readback given by Augustine Wise MD for Prevnar 20. Given in left Deltoid without difficulty.

## 2023-04-25 NOTE — PROGRESS NOTES
HISTORY OF PRESENT ILLNESS  Joanie Sorenson is a 78 y.o. female. 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 . First  passed away in . He was a PHD at [employer].  at age 53 from multiple organ failure. She  remarried in . Has 2 children, son 39 and dtg 40. Lives in son is a  of a  [employer] in [facility] [place] and dtg dtg is an  lives in [place]. No other family in this area. Originally from [place]. [place]. Exercises regularly and \"works hard\". Has cut back on her exercise d/t lower back and leg pain. C/o mid to low back pain for the past couple of months. She has seen ortho and had MRI showing multilevel disc and facet degenerative change with spondylolisthesis at L4-5. There is severe canal stenosis at L4-5 with 6 mm anterolisthesis of L4 on L5. She has follow up with ortho on this Friday. She is having severe pain in the buttocks and both legs. Has tingling sensation and pain in the legs. No numbness or weakness noted. She is having increased pain at night and is keeping her awake at night. Was given diclofenac but this is not helping the pain. No previous hx of back problems noted. Pain is 5-6/10 when at its worse. Patient Active Problem List   Diagnosis Code    Hyperlipidemia E78.5       Current Outpatient Medications   Medication Sig Dispense Refill    diclofenac EC (VOLTAREN) 75 mg EC tablet Take 1 Tablet by mouth two (2) times a day. 60 Tablet 0    methylPREDNISolone (MEDROL DOSEPACK) 4 mg tablet Take as directed 1 Dose Pack 0    MULTIVITS-MIN/IRON/FA/LUTEIN (CENTRUM SILVER WOMEN PO) Take  by mouth.      b complex vitamins tablet Take 1 Tablet by mouth daily. MV,CA,FE,MIN/FA/GUARANA/CAFF (ONE DAILY WOMEN'S METABOLISM PO) Take  by mouth. GLUC LOPEZ/CHONDRO LOPEZ A/VIT C/MN (GLUCOSAMINE 1500 COMPLEX PO) Take  by mouth.      polycarbophil calcium (EQUALACTIN) 500 mg chew Take 2 Tablets by mouth daily.       OMEGA-3 FATTY ACIDS/FISH OIL (FISH OIL OMEGA 3-6-9 PO) Take  by mouth. No Known Allergies      Past Medical History:   Diagnosis Date    Hyperlipidemia        History reviewed. No pertinent surgical history. Family History   Problem Relation Age of Onset    Breast Cancer Sister        Social History     Tobacco Use    Smoking status: Never    Smokeless tobacco: Never   Substance Use Topics    Alcohol use: No        Review of Systems   Constitutional:  Negative for malaise/fatigue. HENT:  Negative for congestion. Eyes:  Negative for blurred vision. Respiratory:  Negative for cough and shortness of breath. Cardiovascular:  Negative for chest pain, palpitations and leg swelling. Gastrointestinal:  Negative for abdominal pain, constipation and heartburn. Genitourinary:  Positive for frequency (at night mostly). Negative for dysuria and urgency. Neurological:  Negative for dizziness and headaches. Endo/Heme/Allergies:  Negative for environmental allergies. Psychiatric/Behavioral:  Negative for depression. The patient does not have insomnia. Physical Exam  Vitals and nursing note reviewed. Constitutional:       Appearance: Normal appearance. She is well-developed. Comments: /86   Pulse 69   Temp 98.1 °F (36.7 °C)   Resp 12   Ht 5' 3\" (1.6 m)   Wt 139 lb 9.6 oz (63.3 kg)   LMP  (LMP Unknown)   SpO2 97%   BMI 24.73 kg/m²    HENT:      Right Ear: Tympanic membrane and ear canal normal.      Left Ear: Tympanic membrane and ear canal normal.      Nose: No mucosal edema or rhinorrhea. Neck:      Thyroid: No thyromegaly. Vascular: No carotid bruit. Cardiovascular:      Rate and Rhythm: Normal rate and regular rhythm. Pulses: Normal pulses. Heart sounds: Normal heart sounds. Pulmonary:      Effort: Pulmonary effort is normal.      Breath sounds: Normal breath sounds.    Chest:   Breasts:     Right: Normal.      Left: Normal.   Abdominal:      General: Bowel sounds are normal.      Palpations: Abdomen is soft. There is no mass. Tenderness: There is no abdominal tenderness. Musculoskeletal:         General: No swelling. Normal range of motion. Cervical back: Normal range of motion and neck supple. Lumbar back: No tenderness or bony tenderness. Normal range of motion. Negative right straight leg raise test and negative left straight leg raise test.      Right lower leg: No edema. Left lower leg: No edema. Lymphadenopathy:      Cervical: No cervical adenopathy. Upper Body:      Right upper body: No supraclavicular, axillary or pectoral adenopathy. Left upper body: No supraclavicular, axillary or pectoral adenopathy. Skin:     General: Skin is warm and dry. Neurological:      General: No focal deficit present. Mental Status: She is alert and oriented to person, place, and time. Sensory: Sensation is intact. Motor: Motor function is intact. Deep Tendon Reflexes: Reflexes are normal and symmetric. Psychiatric:         Mood and Affect: Mood normal.       ASSESSMENT and PLAN  Diagnoses and all orders for this visit:    1. Medicare annual wellness visit, subsequent    2. Encounter to establish care with new doctor    3. Spinal stenosis of lumbosacral region with radiculopathy  -     methylPREDNISolone (MEDROL DOSEPACK) 4 mg tablet; Take as directed  -     gabapentin (NEURONTIN) 100 mg capsule; Take 1 Capsule by mouth two (2) times a day. Max Daily Amount: 200 mg. Take for pain in the buttocks and legs. Instructions for exercises given and reviewed with pt. Pt also to use heat to the area 3-4 times a day over the next several days until sx are improved. Follow up with ortho as planned for Friday. 4. Nocturia  -     URINALYSIS W/MICROSCOPIC; Future    5. Screening for osteoporosis  6. Postmenopausal  -     DEXA BONE DENSITY STUDY AXIAL; Future    7. Screening, lipid  -     CBC W/O DIFF;  Future  -     LIPID PANEL; Future  -     METABOLIC PANEL, COMPREHENSIVE; Future    8. Encounter for screening mammogram for breast cancer  -     [facility] 3D JOANNE W MAMMO BI SCREENING INCL CAD; Future    9. Encounter for immunization  -     PNEUMOCOCCAL, PCV20, PREVNAR 20, (AGE 18 YRS+), IM, PF  Follow up 1 month  reviewed diet, exercise and weight control  cardiovascular risk and specific lipid/LDL goals reviewed  reviewed medications and side effects in detail    I have discussed diagnosis listed in this note with pt and/or family. I have discussed treatment plans and options and the risk/benefit analysis of those options, including safe use of medications and possible medication side effects. Through the use of shared decision making we have agreed to the above plan. The patient has received an after-visit summary and questions were answered concerning future plans and follow up. Advise pt of any urgent changes then to proceed to the ER.

## 2023-04-25 NOTE — PROGRESS NOTES
This is a Subsequent Medicare Annual Wellness Exam (AWV) (Performed 12 months after IPPE or effective date of Medicare Part B enrollment)    I have reviewed the patient's medical history in detail and updated the computerized patient record. History     Patient Active Problem List   Diagnosis Code    Hyperlipidemia E78.5       Current Outpatient Medications   Medication Sig Dispense Refill    diclofenac EC (VOLTAREN) 75 mg EC tablet Take 1 Tablet by mouth two (2) times a day. 60 Tablet 0    methylPREDNISolone (MEDROL DOSEPACK) 4 mg tablet Take as directed 1 Dose Pack 0    gabapentin (NEURONTIN) 100 mg capsule Take 1 Capsule by mouth two (2) times a day. Max Daily Amount: 200 mg. Take for pain in the buttocks and legs. 60 Capsule 1    MULTIVITS-MIN/IRON/FA/LUTEIN (CENTRUM SILVER WOMEN PO) Take  by mouth.      b complex vitamins tablet Take 1 Tablet by mouth daily. MV,CA,FE,MIN/FA/GUARANA/CAFF (ONE DAILY WOMEN'S METABOLISM PO) Take  by mouth. GLUC LOPEZ/CHONDRO LOPEZ A/VIT C/MN (GLUCOSAMINE 1500 COMPLEX PO) Take  by mouth.      polycarbophil calcium (EQUALACTIN) 500 mg chew Take 2 Tablets by mouth daily. OMEGA-3 FATTY ACIDS/FISH OIL (FISH OIL OMEGA 3-6-9 PO) Take  by mouth. No Known Allergies      Past Medical History:   Diagnosis Date    Hyperlipidemia        History reviewed. No pertinent surgical history. Family History   Problem Relation Age of Onset    Breast Cancer Sister        Social History     Tobacco Use    Smoking status: Never    Smokeless tobacco: Never   Substance Use Topics    Alcohol use: No         Depression Risk Factor Screening:     PHQ over the last two weeks    Little interest or pleasure in doing things Not at all   Feeling down, depressed or hopeless Not at all   Total Score PHQ 2 0     Alcohol Risk Factor Screening: You do not drink alcohol or very rarely. Functional Ability and Level of Safety:   Hearing Loss  Hearing is good.     Activities of Daily Living  The home contains: discussed safety equipment. Patient does total self care    Fall RiskFall Risk Assessment, last 12 mths    Able to walk? Yes   Fall in past 12 months? No     Functional Ability:   Does the patient exhibit a steady gait? yes    How long did it take the patient to get up and walk from a sitting position? seconds    Is the patient self reliant? (ie can do own laundry, meals, household chores)  yes   Does the patient handle his/her own medications? yes   Does the patient handle his/her own money? yes   Is the patients home safe (ie good lighting, handrails on stairs and bath, etc.)? yes   Did you notice or did patient express any hearing difficulties? no   Did you notice or did patient express any vision difficulties? no        Advance Care Planning:   Patient was offered the opportunity to discuss advance care planning:  yes    Does patient have an Advance Directive:  no   If no, did you provide information on Caring Connections? yes      Abuse Screen  Patient is not abused    Cognitive Screening   Evaluation of Cognitive Function:  Has your family/caregiver stated any concerns about your memory: no      Patient Care Team   Patient Care Team:  Donald Hector MD as PCP - General    Assessment/Plan   Education and counseling provided:  Are appropriate based on today's review and evaluation  End-of-Life planning (with patient's consent)    ASSESSMENT and PLAN    Medicare Annual Wellness  Continue current treatment plan. Continue annual follow up. I have discussed diagnosis listed in this note with pt and/or family. I have discussed treatment plans and options and the risk/benefit analysis of those options, including safe use of medications and possible medication side effects. Through the use of shared decision making we have agreed to the above plan. The patient has received an after-visit summary and questions were answered concerning future plans and follow up. Advise pt of any urgent changes then to proceed to the ER.

## 2023-04-27 LAB
ALBUMIN SERPL-MCNC: 3.7 G/DL (ref 3.5–5)
ALBUMIN/GLOB SERPL: 1 (ref 1.1–2.2)
ALP SERPL-CCNC: 112 U/L (ref 45–117)
ALT SERPL-CCNC: 148 U/L (ref 12–78)
ANION GAP SERPL CALC-SCNC: 3 MMOL/L (ref 5–15)
APPEARANCE UR: CLEAR
AST SERPL-CCNC: 168 U/L (ref 15–37)
BACTERIA URNS QL MICRO: NEGATIVE /HPF
BILIRUB SERPL-MCNC: 0.6 MG/DL (ref 0.2–1)
BILIRUB UR QL: NEGATIVE
BUN SERPL-MCNC: 13 MG/DL (ref 6–20)
BUN/CREAT SERPL: 21 (ref 12–20)
CALCIUM SERPL-MCNC: 8.9 MG/DL (ref 8.5–10.1)
CHLORIDE SERPL-SCNC: 102 MMOL/L (ref 97–108)
CHOLEST SERPL-MCNC: 259 MG/DL
CO2 SERPL-SCNC: 30 MMOL/L (ref 21–32)
COLOR UR: ABNORMAL
CREAT SERPL-MCNC: 0.61 MG/DL (ref 0.55–1.02)
EPITH CASTS URNS QL MICRO: ABNORMAL /LPF
ERYTHROCYTE [DISTWIDTH] IN BLOOD BY AUTOMATED COUNT: 14 % (ref 11.5–14.5)
GLOBULIN SER CALC-MCNC: 3.7 G/DL (ref 2–4)
GLUCOSE SERPL-MCNC: 143 MG/DL (ref 65–100)
GLUCOSE UR STRIP.AUTO-MCNC: 100 MG/DL
HCT VFR BLD AUTO: 42.7 % (ref 35–47)
HDLC SERPL-MCNC: 110 MG/DL
HDLC SERPL: 2.4 (ref 0–5)
HGB BLD-MCNC: 14 G/DL (ref 11.5–16)
HGB UR QL STRIP: ABNORMAL
HYALINE CASTS URNS QL MICRO: ABNORMAL /LPF (ref 0–5)
KETONES UR QL STRIP.AUTO: NEGATIVE MG/DL
LDLC SERPL CALC-MCNC: 131.2 MG/DL (ref 0–100)
LEUKOCYTE ESTERASE UR QL STRIP.AUTO: ABNORMAL
MCH RBC QN AUTO: 30.8 PG (ref 26–34)
MCHC RBC AUTO-ENTMCNC: 32.8 G/DL (ref 30–36.5)
MCV RBC AUTO: 93.8 FL (ref 80–99)
NITRITE UR QL STRIP.AUTO: NEGATIVE
NRBC # BLD: 0 K/UL (ref 0–0.01)
NRBC BLD-RTO: 0 PER 100 WBC
PH UR STRIP: 6.5 (ref 5–8)
PLATELET # BLD AUTO: 183 K/UL (ref 150–400)
PMV BLD AUTO: 10.5 FL (ref 8.9–12.9)
POTASSIUM SERPL-SCNC: 4.2 MMOL/L (ref 3.5–5.1)
PROT SERPL-MCNC: 7.4 G/DL (ref 6.4–8.2)
PROT UR STRIP-MCNC: ABNORMAL MG/DL
RBC # BLD AUTO: 4.55 M/UL (ref 3.8–5.2)
RBC #/AREA URNS HPF: ABNORMAL /HPF (ref 0–5)
SODIUM SERPL-SCNC: 135 MMOL/L (ref 136–145)
SP GR UR REFRACTOMETRY: 1.02 (ref 1–1.03)
TRIGL SERPL-MCNC: 89 MG/DL (ref ?–150)
UROBILINOGEN UR QL STRIP.AUTO: 0.2 EU/DL (ref 0.2–1)
VLDLC SERPL CALC-MCNC: 17.8 MG/DL
WBC # BLD AUTO: 9.2 K/UL (ref 3.6–11)
WBC URNS QL MICRO: ABNORMAL /HPF (ref 0–4)

## 2023-05-04 ENCOUNTER — TELEPHONE (OUTPATIENT)
Dept: FAMILY MEDICINE CLINIC | Age: 68
End: 2023-05-04

## 2023-05-09 ENCOUNTER — TRANSCRIBE ORDERS (OUTPATIENT)
Facility: HOSPITAL | Age: 68
End: 2023-05-09

## 2023-05-09 DIAGNOSIS — Z13.820 SCREENING FOR OSTEOPOROSIS: ICD-10-CM

## 2023-05-09 DIAGNOSIS — Z78.0 POSTMENOPAUSAL: Primary | ICD-10-CM

## 2023-05-09 DIAGNOSIS — Z12.31 ENCOUNTER FOR SCREENING MAMMOGRAM FOR BREAST CANCER: ICD-10-CM

## 2023-05-15 ENCOUNTER — OFFICE VISIT (OUTPATIENT)
Age: 68
End: 2023-05-15
Payer: MEDICARE

## 2023-05-15 VITALS
OXYGEN SATURATION: 97 % | HEIGHT: 63 IN | RESPIRATION RATE: 12 BRPM | TEMPERATURE: 97.1 F | BODY MASS INDEX: 24.98 KG/M2 | SYSTOLIC BLOOD PRESSURE: 148 MMHG | WEIGHT: 141 LBS | DIASTOLIC BLOOD PRESSURE: 82 MMHG | HEART RATE: 71 BPM

## 2023-05-15 DIAGNOSIS — R79.89 ELEVATED LFTS: ICD-10-CM

## 2023-05-15 DIAGNOSIS — Z79.899 ENCOUNTER FOR LONG-TERM (CURRENT) USE OF MEDICATIONS: ICD-10-CM

## 2023-05-15 DIAGNOSIS — R73.9 HYPERGLYCEMIA: ICD-10-CM

## 2023-05-15 DIAGNOSIS — R31.29 MICROHEMATURIA: ICD-10-CM

## 2023-05-15 DIAGNOSIS — E78.2 MIXED HYPERLIPIDEMIA: ICD-10-CM

## 2023-05-15 DIAGNOSIS — I10 PRIMARY HYPERTENSION: Primary | ICD-10-CM

## 2023-05-15 DIAGNOSIS — M48.07 SPINAL STENOSIS, LUMBOSACRAL REGION: ICD-10-CM

## 2023-05-15 DIAGNOSIS — M54.17 RADICULOPATHY, LUMBOSACRAL REGION: ICD-10-CM

## 2023-05-15 LAB
GLUCOSE, POC: 104 MG/DL
HBA1C MFR BLD: 5.8 %

## 2023-05-15 PROCEDURE — 3078F DIAST BP <80 MM HG: CPT | Performed by: FAMILY MEDICINE

## 2023-05-15 PROCEDURE — 3077F SYST BP >= 140 MM HG: CPT | Performed by: FAMILY MEDICINE

## 2023-05-15 PROCEDURE — 1036F TOBACCO NON-USER: CPT | Performed by: FAMILY MEDICINE

## 2023-05-15 PROCEDURE — 1090F PRES/ABSN URINE INCON ASSESS: CPT | Performed by: FAMILY MEDICINE

## 2023-05-15 PROCEDURE — G8400 PT W/DXA NO RESULTS DOC: HCPCS | Performed by: FAMILY MEDICINE

## 2023-05-15 PROCEDURE — 3017F COLORECTAL CA SCREEN DOC REV: CPT | Performed by: FAMILY MEDICINE

## 2023-05-15 PROCEDURE — 83036 HEMOGLOBIN GLYCOSYLATED A1C: CPT | Performed by: FAMILY MEDICINE

## 2023-05-15 PROCEDURE — G8427 DOCREV CUR MEDS BY ELIG CLIN: HCPCS | Performed by: FAMILY MEDICINE

## 2023-05-15 PROCEDURE — 82962 GLUCOSE BLOOD TEST: CPT | Performed by: FAMILY MEDICINE

## 2023-05-15 PROCEDURE — PBSHW AMB POC HEMOGLOBIN A1C: Performed by: FAMILY MEDICINE

## 2023-05-15 PROCEDURE — 99213 OFFICE O/P EST LOW 20 MIN: CPT | Performed by: FAMILY MEDICINE

## 2023-05-15 PROCEDURE — 1123F ACP DISCUSS/DSCN MKR DOCD: CPT | Performed by: FAMILY MEDICINE

## 2023-05-15 PROCEDURE — PBSHW AMB POC GLUCOSE BLOOD, BY GLUCOSE MONITORING DEVICE: Performed by: FAMILY MEDICINE

## 2023-05-15 PROCEDURE — G8420 CALC BMI NORM PARAMETERS: HCPCS | Performed by: FAMILY MEDICINE

## 2023-05-15 RX ORDER — AMLODIPINE BESYLATE 2.5 MG/1
2.5 TABLET ORAL DAILY
Qty: 30 TABLET | Refills: 3 | Status: SHIPPED | OUTPATIENT
Start: 2023-05-15 | End: 2023-05-15 | Stop reason: SDUPTHER

## 2023-05-15 RX ORDER — AMLODIPINE BESYLATE 2.5 MG/1
2.5 TABLET ORAL DAILY
Qty: 30 TABLET | Refills: 3 | Status: SHIPPED | OUTPATIENT
Start: 2023-05-15

## 2023-05-15 RX ORDER — OMEGA-3/DHA/EPA/FISH OIL 300-1000MG
1 CAPSULE ORAL 2 TIMES DAILY
COMMUNITY

## 2023-05-15 SDOH — ECONOMIC STABILITY: HOUSING INSECURITY
IN THE LAST 12 MONTHS, WAS THERE A TIME WHEN YOU DID NOT HAVE A STEADY PLACE TO SLEEP OR SLEPT IN A SHELTER (INCLUDING NOW)?: NO

## 2023-05-15 SDOH — ECONOMIC STABILITY: INCOME INSECURITY: HOW HARD IS IT FOR YOU TO PAY FOR THE VERY BASICS LIKE FOOD, HOUSING, MEDICAL CARE, AND HEATING?: NOT HARD AT ALL

## 2023-05-15 SDOH — ECONOMIC STABILITY: FOOD INSECURITY: WITHIN THE PAST 12 MONTHS, THE FOOD YOU BOUGHT JUST DIDN'T LAST AND YOU DIDN'T HAVE MONEY TO GET MORE.: NEVER TRUE

## 2023-05-15 SDOH — ECONOMIC STABILITY: FOOD INSECURITY: WITHIN THE PAST 12 MONTHS, YOU WORRIED THAT YOUR FOOD WOULD RUN OUT BEFORE YOU GOT MONEY TO BUY MORE.: NEVER TRUE

## 2023-05-15 ASSESSMENT — PATIENT HEALTH QUESTIONNAIRE - PHQ9
1. LITTLE INTEREST OR PLEASURE IN DOING THINGS: 0
SUM OF ALL RESPONSES TO PHQ QUESTIONS 1-9: 0
SUM OF ALL RESPONSES TO PHQ9 QUESTIONS 1 & 2: 0
SUM OF ALL RESPONSES TO PHQ QUESTIONS 1-9: 0
2. FEELING DOWN, DEPRESSED OR HOPELESS: 0
SUM OF ALL RESPONSES TO PHQ QUESTIONS 1-9: 0
SUM OF ALL RESPONSES TO PHQ QUESTIONS 1-9: 0

## 2023-05-15 ASSESSMENT — ENCOUNTER SYMPTOMS
CHEST TIGHTNESS: 0
COUGH: 0
RHINORRHEA: 0
CONSTIPATION: 0
BLOOD IN STOOL: 0
SHORTNESS OF BREATH: 0
BACK PAIN: 1
ABDOMINAL PAIN: 0

## 2023-05-15 NOTE — PROGRESS NOTES
Subjective:      Patient ID: Az Womack is a 78 y.o. female. HPI  Follow up on abnormal labs and elevated blood pressure from her last visit. She had noted elevated LFTs and microhematuria. She denies any sx of abd pain or seeing blood in the urine. Previous hx negative. She does not drink alcoholic beverages. She also had follow up with ortho for her lower back pain dt/ DD and spinal stenosis. She is planning for surgery for July. Patient here for follow-up of elevated blood pressure. She is not exercising due to her back pain and is adherent to a low-salt diet. Patient denies: chest pain, dyspnea, fatigue, lower extremity edema, near-syncope, and palpitations. Cardiovascular risk factors: advanced age (older than 54 for men, 72 for women). Use of agents associated with hypertension: none. History of target organ damage: none. Past Medical History:   Diagnosis Date    Hyperlipidemia    No past surgical history on file. No current outpatient medications on file. No current facility-administered medications for this visit. Family History   Problem Relation Age of Onset    Breast Cancer Sister       Social History     Socioeconomic History    Marital status:    Tobacco Use    Smoking status: Never    Smokeless tobacco: Never   Substance and Sexual Activity    Alcohol use: No    Drug use: No   Social History Narrative    Owns restaurant. . 2 adult children who live in [place].     Social Determinants of Health     Financial Resource Strain: Low Risk     Difficulty of Paying Living Expenses: Not hard at all   Food Insecurity: No Food Insecurity    Worried About Running Out of Food in the Last Year: Never true    Ran Out of Food in the Last Year: Never true   Physical Activity: Unknown    Days of Exercise per Week: Patient refused   Intimate Partner Violence: Not At Risk    Fear of Current or Ex-Partner: No    Emotionally Abused: No    Physically Abused:  No

## 2023-05-15 NOTE — PROGRESS NOTES
Patient identified by 2 indentifiers  Chief Complaint   Patient presents with    Follow-up    Diabetes     1. Have you been to the ER, urgent care clinic since your last visit? Hospitalized since your last visit? Yes [facility]    2. Have you seen or consulted any other health care providers outside of the [address] [place] [address] since your last visit? Include any pap smears or colon screening.  No

## 2023-05-16 ENCOUNTER — TELEPHONE (OUTPATIENT)
Age: 68
End: 2023-05-16

## 2023-05-16 LAB
ALBUMIN SERPL-MCNC: 3.6 G/DL (ref 3.5–5)
ALBUMIN/GLOB SERPL: 1 (ref 1.1–2.2)
ALP SERPL-CCNC: 86 U/L (ref 45–117)
ALT SERPL-CCNC: 88 U/L (ref 12–78)
APPEARANCE UR: CLEAR
AST SERPL-CCNC: 41 U/L (ref 15–37)
BACTERIA URNS QL MICRO: NEGATIVE /HPF
BILIRUB DIRECT SERPL-MCNC: 0.1 MG/DL (ref 0–0.2)
BILIRUB SERPL-MCNC: 0.5 MG/DL (ref 0.2–1)
BILIRUB UR QL: NEGATIVE
COLOR UR: NORMAL
EPITH CASTS URNS QL MICRO: NORMAL /LPF
GLOBULIN SER CALC-MCNC: 3.5 G/DL (ref 2–4)
GLUCOSE UR STRIP.AUTO-MCNC: NEGATIVE MG/DL
HGB UR QL STRIP: NEGATIVE
HYALINE CASTS URNS QL MICRO: NORMAL /LPF (ref 0–5)
KETONES UR QL STRIP.AUTO: NEGATIVE MG/DL
LEUKOCYTE ESTERASE UR QL STRIP.AUTO: NEGATIVE
NITRITE UR QL STRIP.AUTO: NEGATIVE
PH UR STRIP: 6 (ref 5–8)
PROT SERPL-MCNC: 7.1 G/DL (ref 6.4–8.2)
PROT UR STRIP-MCNC: NEGATIVE MG/DL
RBC #/AREA URNS HPF: NORMAL /HPF (ref 0–5)
SP GR UR REFRACTOMETRY: 1.01 (ref 1–1.03)
UROBILINOGEN UR QL STRIP.AUTO: 0.2 EU/DL (ref 0.2–1)
WBC URNS QL MICRO: NORMAL /HPF (ref 0–4)

## 2023-05-16 NOTE — TELEPHONE ENCOUNTER
----- Message from Taiwo Valverde sent at 5/15/2023  3:12 PM EDT -----  Subject: Message to Provider    QUESTIONS  Information for Provider? pt called and ask that [facility] call her back about   appt date and time   ---------------------------------------------------------------------------  --------------  Nilo Rogel INFO  [phone]; OK to leave message on voicemail  ---------------------------------------------------------------------------  --------------  SCRIPT ANSWERS  Relationship to Patient?  Self

## 2023-05-17 LAB
BACTERIA SPEC CULT: NORMAL
SERVICE CMNT-IMP: NORMAL

## 2023-05-18 ENCOUNTER — TELEPHONE (OUTPATIENT)
Age: 68
End: 2023-05-18

## 2023-05-18 DIAGNOSIS — R79.89 ELEVATED LFTS: Primary | ICD-10-CM

## 2023-05-25 RX ORDER — GABAPENTIN 100 MG/1
100 CAPSULE ORAL 2 TIMES DAILY
COMMUNITY
Start: 2023-04-25 | End: 2023-05-30

## 2023-05-30 ENCOUNTER — HOSPITAL ENCOUNTER (OUTPATIENT)
Facility: HOSPITAL | Age: 68
Discharge: HOME | End: 2023-06-02
Payer: MEDICARE

## 2023-05-30 VITALS
RESPIRATION RATE: 16 BRPM | TEMPERATURE: 97.3 F | HEIGHT: 63 IN | BODY MASS INDEX: 24.8 KG/M2 | WEIGHT: 139.99 LBS | OXYGEN SATURATION: 96 % | HEART RATE: 69 BPM

## 2023-05-30 LAB
25(OH)D3 SERPL-MCNC: 49.9 NG/ML (ref 30–100)
ABO + RH BLD: NORMAL
ALBUMIN SERPL-MCNC: 4 G/DL (ref 3.5–5)
ALBUMIN/GLOB SERPL: 1 (ref 1.1–2.2)
ALP SERPL-CCNC: 97 U/L (ref 45–117)
ALT SERPL-CCNC: 58 U/L (ref 12–78)
ANION GAP SERPL CALC-SCNC: 6 MMOL/L (ref 5–15)
APPEARANCE UR: CLEAR
APTT PPP: 25.1 SEC (ref 22.1–31)
AST SERPL-CCNC: 36 U/L (ref 15–37)
BACTERIA URNS QL MICRO: NEGATIVE /HPF
BASOPHILS # BLD: 0.1 K/UL (ref 0–0.1)
BASOPHILS NFR BLD: 1 % (ref 0–1)
BILIRUB SERPL-MCNC: 0.5 MG/DL (ref 0.2–1)
BILIRUB UR QL: NEGATIVE
BLOOD GROUP ANTIBODIES SERPL: NORMAL
BUN SERPL-MCNC: 14 MG/DL (ref 6–20)
BUN/CREAT SERPL: 22 (ref 12–20)
CALCIUM SERPL-MCNC: 9.3 MG/DL (ref 8.5–10.1)
CHLORIDE SERPL-SCNC: 103 MMOL/L (ref 97–108)
CO2 SERPL-SCNC: 29 MMOL/L (ref 21–32)
COLOR UR: NORMAL
CREAT SERPL-MCNC: 0.63 MG/DL (ref 0.55–1.02)
DIFFERENTIAL METHOD BLD: NORMAL
EOSINOPHIL # BLD: 0.2 K/UL (ref 0–0.4)
EOSINOPHIL NFR BLD: 4 % (ref 0–7)
EPITH CASTS URNS QL MICRO: NORMAL /LPF
ERYTHROCYTE [DISTWIDTH] IN BLOOD BY AUTOMATED COUNT: 14 % (ref 11.5–14.5)
EST. AVERAGE GLUCOSE BLD GHB EST-MCNC: 126 MG/DL
GLOBULIN SER CALC-MCNC: 4 G/DL (ref 2–4)
GLUCOSE SERPL-MCNC: 113 MG/DL (ref 65–100)
GLUCOSE UR STRIP.AUTO-MCNC: NEGATIVE MG/DL
HBA1C MFR BLD: 6 % (ref 4–5.6)
HCT VFR BLD AUTO: 44.3 % (ref 35–47)
HGB BLD-MCNC: 14.7 G/DL (ref 11.5–16)
HGB UR QL STRIP: NEGATIVE
IMM GRANULOCYTES # BLD AUTO: 0 K/UL (ref 0–0.04)
IMM GRANULOCYTES NFR BLD AUTO: 0 % (ref 0–0.5)
INR PPP: 0.9 (ref 0.9–1.1)
KETONES UR QL STRIP.AUTO: NEGATIVE MG/DL
LEUKOCYTE ESTERASE UR QL STRIP.AUTO: NEGATIVE
LYMPHOCYTES # BLD: 1.4 K/UL (ref 0.8–3.5)
LYMPHOCYTES NFR BLD: 32 % (ref 12–49)
MCH RBC QN AUTO: 30.3 PG (ref 26–34)
MCHC RBC AUTO-ENTMCNC: 33.2 G/DL (ref 30–36.5)
MCV RBC AUTO: 91.3 FL (ref 80–99)
MONOCYTES # BLD: 0.4 K/UL (ref 0–1)
MONOCYTES NFR BLD: 9 % (ref 5–13)
NEUTS SEG # BLD: 2.3 K/UL (ref 1.8–8)
NEUTS SEG NFR BLD: 54 % (ref 32–75)
NITRITE UR QL STRIP.AUTO: NEGATIVE
NRBC # BLD: 0 K/UL (ref 0–0.01)
NRBC BLD-RTO: 0 PER 100 WBC
PH UR STRIP: 7 (ref 5–8)
PLATELET # BLD AUTO: 238 K/UL (ref 150–400)
PMV BLD AUTO: 9.4 FL (ref 8.9–12.9)
POTASSIUM SERPL-SCNC: 3.9 MMOL/L (ref 3.5–5.1)
PROT SERPL-MCNC: 8 G/DL (ref 6.4–8.2)
PROT UR STRIP-MCNC: NEGATIVE MG/DL
PROTHROMBIN TIME: 9.8 SEC (ref 9–11.1)
RBC # BLD AUTO: 4.85 M/UL (ref 3.8–5.2)
RBC #/AREA URNS HPF: NORMAL /HPF (ref 0–5)
SODIUM SERPL-SCNC: 138 MMOL/L (ref 136–145)
SP GR UR REFRACTOMETRY: 1.01 (ref 1–1.03)
SPECIMEN EXP DATE BLD: NORMAL
THERAPEUTIC RANGE: NORMAL SECS (ref 58–77)
URINE CULTURE IF INDICATED: NORMAL
UROBILINOGEN UR QL STRIP.AUTO: 0.2 EU/DL (ref 0.2–1)
WBC # BLD AUTO: 4.3 K/UL (ref 3.6–11)
WBC URNS QL MICRO: NORMAL /HPF (ref 0–4)

## 2023-05-30 PROCEDURE — 85730 THROMBOPLASTIN TIME PARTIAL: CPT

## 2023-05-30 PROCEDURE — 83036 HEMOGLOBIN GLYCOSYLATED A1C: CPT

## 2023-05-30 PROCEDURE — 82306 VITAMIN D 25 HYDROXY: CPT

## 2023-05-30 PROCEDURE — 80053 COMPREHEN METABOLIC PANEL: CPT

## 2023-05-30 PROCEDURE — 85610 PROTHROMBIN TIME: CPT

## 2023-05-30 PROCEDURE — 86901 BLOOD TYPING SEROLOGIC RH(D): CPT

## 2023-05-30 PROCEDURE — 36415 COLL VENOUS BLD VENIPUNCTURE: CPT

## 2023-05-30 PROCEDURE — 86900 BLOOD TYPING SEROLOGIC ABO: CPT

## 2023-05-30 PROCEDURE — 81001 URINALYSIS AUTO W/SCOPE: CPT

## 2023-05-30 PROCEDURE — 86850 RBC ANTIBODY SCREEN: CPT

## 2023-05-30 PROCEDURE — 85025 COMPLETE CBC W/AUTO DIFF WBC: CPT

## 2023-05-30 RX ORDER — ACETAMINOPHEN 160 MG
1 TABLET,DISINTEGRATING ORAL DAILY
COMMUNITY

## 2023-05-30 RX ORDER — MULTIVITAMIN WITH IRON
250 TABLET ORAL DAILY
COMMUNITY

## 2023-05-30 RX ORDER — OMEGA-3 FATTY ACIDS CAP DELAYED RELEASE 1000 MG 1000 MG
3000 CAPSULE DELAYED RELEASE ORAL
COMMUNITY

## 2023-05-30 RX ORDER — AMLODIPINE BESYLATE 5 MG/1
5 TABLET ORAL DAILY
COMMUNITY

## 2023-05-30 RX ORDER — M-VIT,TX,IRON,MINS/CALC/FOLIC 27MG-0.4MG
1 TABLET ORAL DAILY
COMMUNITY
End: 2023-05-30

## 2023-05-30 ASSESSMENT — ENCOUNTER SYMPTOMS
WHEEZING: 0
VOMITING: 0
TROUBLE SWALLOWING: 0
NAUSEA: 0
BACK PAIN: 1
SHORTNESS OF BREATH: 0
SORE THROAT: 0
BLOOD IN STOOL: 0
COUGH: 0
ABDOMINAL PAIN: 0

## 2023-05-30 NOTE — H&P
Sue Machuca was referred for evaluation by:Dr. Amaury Alcazar for Pre- Op Evaluation. Please see encounter details and orders for consultative summary. Type of surgery : L4-L5 Lumbar laminectomy, Fusion, Instrumentation, TLIF, Bone graft  Surgery site : Lumbar spine  Anesthesia type: General  Date of procedure:  6/13/2023    This 78y.o. year old female presents with complaints of lower back pain with pain radiating down both legs for the past 6+ months. Conservative measures including medication management, LISSET have been exhausted prior to the decision for surgery. Patient has discussed the risks, alternatives, and benefits of the surgery with surgeon and has elected to proceed with surgical intervention. Has recently been started on Amlodipine 2.5 mg daily for hypertension but did not notice any improvement so she increased dose to 2 tabs daily after speaking to a physician friend. Has only noticed a slight difference in her blood pressures since increasing dose.     Allergies: No Known Allergies  Latex allergy: no  Prior reactions to anesthesia:  None     Current Outpatient Medications   Medication Sig    NONFORMULARY 1 tablet daily Mussel 2000    magnesium (MAGNESIUM-OXIDE) 250 MG TABS tablet Take 1 tablet by mouth daily    Cholecalciferol (VITAMIN D3) 50 MCG (2000 UT) CAPS Take 1 capsule by mouth daily    Omega-3 Fatty Acids (FISH OIL) 1000 MG CPDR Take 3 capsules by mouth    CRANBERRY PO Take 2 tablets by mouth daily 650 mg    CALCIUM PO Take 1 tablet by mouth daily citracal    Multiple Vitamins-Minerals (PRESERVISION AREDS PO) Take 2 tablets by mouth daily    Cyanocobalamin (VITAMIN B12 PO) Take 1 tablet by mouth daily 2500 mg    TURMERIC PO Take 2 tablets by mouth daily    NONFORMULARY 1 tablet daily estroven    NONFORMULARY 2 tablets daily Hair skin and nails vitamin    Ascorbic Acid (VITAMIN C PO) Take 1 tablet by mouth daily    amLODIPine (NORVASC) 5 MG tablet Take 1 tablet by mouth daily    B COMPLEX

## 2023-05-30 NOTE — PERIOP NOTE
[address] , [address] [facility]   MAIN OR                                  [phone]   MAIN PRE OP                          [phone]                                                                                AMBULATORY PRE OP          [phone]  PRE-ADMISSION TESTING    [phone]   Surgery Date:  Tuesday 6/13/23       Is surgery arrival time given by surgeon? YES  NO  If NO, [facility] staff will call you between 3 and 7pm the day before your surgery with your arrival time. (If your surgery is on a Monday, we will call you the Friday before.)    Call [phone] after 7pm Monday-Friday if you did not receive this call. INSTRUCTIONS BEFORE YOUR SURGERY   When You  Arrive Arrive at the [address]nd [address] [facility] on the day of your surgery  Have your insurance card, photo ID, and any copayment (if needed)   Food   and   Drink NO food or drink after midnight the night before surgery    This means NO water, gum, mints, coffee, juice, etc.  No alcohol (beer, wine, liquor) 24 hours before and after surgery   Medications to   TAKE   Morning of Surgery MEDICATIONS TO TAKE THE MORNING OF SURGERY WITH A SIP OF WATER:   amlodipine   Medications  To  STOP      7 days before surgery Non-Steroidal anti-inflammatory Drugs (NSAID's): for example, Ibuprofen (Advil, Motrin), Naproxen (Aleve)  Aspirin, if taking for pain   Herbal supplements, vitamins, and fish oil  Other:  (Pain medications not listed above, including Tylenol may be taken)   Blood  Thinners If you take  Aspirin, Plavix, Coumadin, or any blood-thinning or anti-blood clot medicine, talk to the doctor who prescribed the medications for pre-operative instructions.    Bathing Clothing  Jewelry  Valuables     If you shower the morning of surgery, please do not apply anything to your skin (lotions, powders, deodorant, or makeup, especially mascara)  Follow Chlorhex[facility] [employer]

## 2023-05-31 LAB
BACTERIA SPEC CULT: NORMAL
BACTERIA SPEC CULT: NORMAL
EKG ATRIAL RATE: 62 BPM
EKG DIAGNOSIS: NORMAL
EKG P AXIS: 13 DEGREES
EKG P-R INTERVAL: 152 MS
EKG Q-T INTERVAL: 458 MS
EKG QRS DURATION: 150 MS
EKG QTC CALCULATION (BAZETT): 464 MS
EKG R AXIS: -2 DEGREES
EKG T AXIS: 12 DEGREES
EKG VENTRICULAR RATE: 62 BPM
SERVICE CMNT-IMP: NORMAL

## 2023-06-05 ENCOUNTER — HOSPITAL ENCOUNTER (OUTPATIENT)
Facility: HOSPITAL | Age: 68
Discharge: HOME | End: 2023-06-08
Payer: MEDICARE

## 2023-06-05 DIAGNOSIS — R79.89 ELEVATED LFTS: ICD-10-CM

## 2023-06-05 PROCEDURE — 76705 ECHO EXAM OF ABDOMEN: CPT

## 2023-06-13 ENCOUNTER — HOSPITAL ENCOUNTER (INPATIENT)
Facility: HOSPITAL | Age: 68
LOS: 2 days | Discharge: HOME | DRG: 455 | End: 2023-06-15
Attending: ORTHOPAEDIC SURGERY | Admitting: ORTHOPAEDIC SURGERY
Payer: MEDICARE

## 2023-06-13 ENCOUNTER — APPOINTMENT (OUTPATIENT)
Facility: HOSPITAL | Age: 68
DRG: 455 | End: 2023-06-13
Attending: ORTHOPAEDIC SURGERY
Payer: MEDICARE

## 2023-06-13 DIAGNOSIS — M48.062 LUMBAR STENOSIS WITH NEUROGENIC CLAUDICATION: Primary | ICD-10-CM

## 2023-06-13 PROCEDURE — 2500000003 HC RX 250 WO HCPCS: Performed by: ORTHOPAEDIC SURGERY

## 2023-06-13 PROCEDURE — 0SG0071 FUSION OF LUMBAR VERTEBRAL JOINT WITH AUTOLOGOUS TISSUE SUBSTITUTE, POSTERIOR APPROACH, POSTERIOR COLUMN, OPEN APPROACH: ICD-10-PCS | Performed by: ORTHOPAEDIC SURGERY

## 2023-06-13 PROCEDURE — 3700000001 HC ADD 15 MINUTES (ANESTHESIA): Performed by: ORTHOPAEDIC SURGERY

## 2023-06-13 PROCEDURE — 2580000003 HC RX 258: Performed by: ORTHOPAEDIC SURGERY

## 2023-06-13 PROCEDURE — C9290 INJ, BUPIVACAINE LIPOSOME: HCPCS | Performed by: ORTHOPAEDIC SURGERY

## 2023-06-13 PROCEDURE — 2720000010 HC SURG SUPPLY STERILE: Performed by: ORTHOPAEDIC SURGERY

## 2023-06-13 PROCEDURE — 0SB20ZZ EXCISION OF LUMBAR VERTEBRAL DISC, OPEN APPROACH: ICD-10-PCS | Performed by: ORTHOPAEDIC SURGERY

## 2023-06-13 PROCEDURE — 6360000002 HC RX W HCPCS: Performed by: ORTHOPAEDIC SURGERY

## 2023-06-13 PROCEDURE — 3600000014 HC SURGERY LEVEL 4 ADDTL 15MIN: Performed by: ORTHOPAEDIC SURGERY

## 2023-06-13 PROCEDURE — 6370000000 HC RX 637 (ALT 250 FOR IP): Performed by: ORTHOPAEDIC SURGERY

## 2023-06-13 PROCEDURE — 1100000000 HC RM PRIVATE

## 2023-06-13 PROCEDURE — 6370000000 HC RX 637 (ALT 250 FOR IP): Performed by: ANESTHESIOLOGY

## 2023-06-13 PROCEDURE — 94761 N-INVAS EAR/PLS OXIMETRY MLT: CPT

## 2023-06-13 PROCEDURE — 7100000001 HC PACU RECOVERY - ADDTL 15 MIN: Performed by: ORTHOPAEDIC SURGERY

## 2023-06-13 PROCEDURE — C1762 CONN TISS, HUMAN(INC FASCIA): HCPCS | Performed by: ORTHOPAEDIC SURGERY

## 2023-06-13 PROCEDURE — 7100000000 HC PACU RECOVERY - FIRST 15 MIN: Performed by: ORTHOPAEDIC SURGERY

## 2023-06-13 PROCEDURE — 3600000004 HC SURGERY LEVEL 4 BASE: Performed by: ORTHOPAEDIC SURGERY

## 2023-06-13 PROCEDURE — C1889 IMPLANT/INSERT DEVICE, NOC: HCPCS | Performed by: ORTHOPAEDIC SURGERY

## 2023-06-13 PROCEDURE — 0SG00AJ FUSION OF LUMBAR VERTEBRAL JOINT WITH INTERBODY FUSION DEVICE, POSTERIOR APPROACH, ANTERIOR COLUMN, OPEN APPROACH: ICD-10-PCS | Performed by: ORTHOPAEDIC SURGERY

## 2023-06-13 PROCEDURE — 4A11X4G MONITORING OF PERIPHERAL NERVOUS ELECTRICAL ACTIVITY, INTRAOPERATIVE, EXTERNAL APPROACH: ICD-10-PCS | Performed by: ORTHOPAEDIC SURGERY

## 2023-06-13 PROCEDURE — C1713 ANCHOR/SCREW BN/BN,TIS/BN: HCPCS | Performed by: ORTHOPAEDIC SURGERY

## 2023-06-13 PROCEDURE — 3700000000 HC ANESTHESIA ATTENDED CARE: Performed by: ORTHOPAEDIC SURGERY

## 2023-06-13 PROCEDURE — C1729 CATH, DRAINAGE: HCPCS | Performed by: ORTHOPAEDIC SURGERY

## 2023-06-13 PROCEDURE — 01NB0ZZ RELEASE LUMBAR NERVE, OPEN APPROACH: ICD-10-PCS | Performed by: ORTHOPAEDIC SURGERY

## 2023-06-13 PROCEDURE — 2709999900 HC NON-CHARGEABLE SUPPLY: Performed by: ORTHOPAEDIC SURGERY

## 2023-06-13 DEVICE — GRAFT BNE 30CC 1 8MM CANC CRUSH CHIP READIGRFT: Type: IMPLANTABLE DEVICE | Site: SPINE LUMBAR | Status: FUNCTIONAL

## 2023-06-13 DEVICE — SET SCR SPNL TI STREAMLINE: Type: IMPLANTABLE DEVICE | Site: SPINE LUMBAR | Status: FUNCTIONAL

## 2023-06-13 DEVICE — SPACER SPNL 12X22 MM BULL TIP IF PEEK VBR: Type: IMPLANTABLE DEVICE | Site: SPINE LUMBAR | Status: FUNCTIONAL

## 2023-06-13 DEVICE — SCREW SPNL L50MM DIA6.5MM THORLUM TI ALLY POLYAX STREAMLINE: Type: IMPLANTABLE DEVICE | Site: SPINE LUMBAR | Status: FUNCTIONAL

## 2023-06-13 DEVICE — ALLOGRAFT BNE MOLD 10 CC VIABLE BNE MTRX VIBONE: Type: IMPLANTABLE DEVICE | Site: SPINE LUMBAR | Status: FUNCTIONAL

## 2023-06-13 DEVICE — DURAGEN® SUTURABLE DURAL REGENERATION MATRIX, 2 IN X 2 IN (5 CM X 5 CM)
Type: IMPLANTABLE DEVICE | Site: SPINE LUMBAR | Status: FUNCTIONAL
Brand: DURAGEN® SUTURABLE

## 2023-06-13 DEVICE — SCREW SPNL L45MM DIA6.5MM 60DEG THORLUM PEDCL TI ALLY: Type: IMPLANTABLE DEVICE | Site: SPINE LUMBAR | Status: FUNCTIONAL

## 2023-06-13 DEVICE — ROD, TI, PREBENT, 5.5X40
Type: IMPLANTABLE DEVICE | Site: SPINE LUMBAR | Status: FUNCTIONAL
Brand: CORTERA

## 2023-06-13 RX ORDER — SODIUM CHLORIDE 0.9 % (FLUSH) 0.9 %
5-40 SYRINGE (ML) INJECTION PRN
Status: DISCONTINUED | OUTPATIENT
Start: 2023-06-13 | End: 2023-06-15 | Stop reason: HOSPADM

## 2023-06-13 RX ORDER — MIDAZOLAM HYDROCHLORIDE 2 MG/2ML
2 INJECTION, SOLUTION INTRAMUSCULAR; INTRAVENOUS
Status: DISCONTINUED | OUTPATIENT
Start: 2023-06-13 | End: 2023-06-13 | Stop reason: HOSPADM

## 2023-06-13 RX ORDER — SODIUM CHLORIDE 0.9 % (FLUSH) 0.9 %
5-40 SYRINGE (ML) INJECTION EVERY 12 HOURS SCHEDULED
Status: DISCONTINUED | OUTPATIENT
Start: 2023-06-13 | End: 2023-06-15 | Stop reason: HOSPADM

## 2023-06-13 RX ORDER — LANOLIN ALCOHOL/MO/W.PET/CERES
200 CREAM (GRAM) TOPICAL DAILY
Status: DISCONTINUED | OUTPATIENT
Start: 2023-06-14 | End: 2023-06-15 | Stop reason: HOSPADM

## 2023-06-13 RX ORDER — FENTANYL CITRATE 50 UG/ML
100 INJECTION, SOLUTION INTRAMUSCULAR; INTRAVENOUS
Status: DISCONTINUED | OUTPATIENT
Start: 2023-06-13 | End: 2023-06-13 | Stop reason: HOSPADM

## 2023-06-13 RX ORDER — SODIUM CHLORIDE 9 MG/ML
INJECTION, SOLUTION INTRAVENOUS CONTINUOUS
Status: DISPENSED | OUTPATIENT
Start: 2023-06-13 | End: 2023-06-14

## 2023-06-13 RX ORDER — NALOXONE HYDROCHLORIDE 0.4 MG/ML
INJECTION, SOLUTION INTRAMUSCULAR; INTRAVENOUS; SUBCUTANEOUS PRN
Status: DISCONTINUED | OUTPATIENT
Start: 2023-06-13 | End: 2023-06-15 | Stop reason: HOSPADM

## 2023-06-13 RX ORDER — SODIUM CHLORIDE 9 MG/ML
INJECTION, SOLUTION INTRAVENOUS PRN
Status: DISCONTINUED | OUTPATIENT
Start: 2023-06-13 | End: 2023-06-15 | Stop reason: HOSPADM

## 2023-06-13 RX ORDER — VANCOMYCIN HYDROCHLORIDE 1 G/20ML
INJECTION, POWDER, LYOPHILIZED, FOR SOLUTION INTRAVENOUS PRN
Status: DISCONTINUED | OUTPATIENT
Start: 2023-06-13 | End: 2023-06-13 | Stop reason: ALTCHOICE

## 2023-06-13 RX ORDER — SODIUM CHLORIDE, SODIUM LACTATE, POTASSIUM CHLORIDE, CALCIUM CHLORIDE 600; 310; 30; 20 MG/100ML; MG/100ML; MG/100ML; MG/100ML
INJECTION, SOLUTION INTRAVENOUS CONTINUOUS
Status: DISCONTINUED | OUTPATIENT
Start: 2023-06-13 | End: 2023-06-13 | Stop reason: HOSPADM

## 2023-06-13 RX ORDER — LIDOCAINE HYDROCHLORIDE 10 MG/ML
1 INJECTION, SOLUTION EPIDURAL; INFILTRATION; INTRACAUDAL; PERINEURAL
Status: DISCONTINUED | OUTPATIENT
Start: 2023-06-13 | End: 2023-06-13 | Stop reason: HOSPADM

## 2023-06-13 RX ORDER — OXYCODONE HYDROCHLORIDE 5 MG/1
10 TABLET ORAL
Status: DISCONTINUED | OUTPATIENT
Start: 2023-06-13 | End: 2023-06-15 | Stop reason: HOSPADM

## 2023-06-13 RX ORDER — CYCLOBENZAPRINE HCL 10 MG
10 TABLET ORAL 3 TIMES DAILY PRN
Status: DISCONTINUED | OUTPATIENT
Start: 2023-06-13 | End: 2023-06-15 | Stop reason: HOSPADM

## 2023-06-13 RX ORDER — DIPHENHYDRAMINE HCL 25 MG
25 CAPSULE ORAL EVERY 6 HOURS PRN
Status: DISCONTINUED | OUTPATIENT
Start: 2023-06-13 | End: 2023-06-15 | Stop reason: HOSPADM

## 2023-06-13 RX ORDER — MAGNESIUM OXIDE 400 MG/1
200 TABLET ORAL DAILY
Status: DISCONTINUED | OUTPATIENT
Start: 2023-06-14 | End: 2023-06-13

## 2023-06-13 RX ORDER — ONDANSETRON 2 MG/ML
4 INJECTION INTRAMUSCULAR; INTRAVENOUS EVERY 4 HOURS PRN
Status: DISCONTINUED | OUTPATIENT
Start: 2023-06-13 | End: 2023-06-15 | Stop reason: HOSPADM

## 2023-06-13 RX ORDER — SENNA AND DOCUSATE SODIUM 50; 8.6 MG/1; MG/1
1 TABLET, FILM COATED ORAL 2 TIMES DAILY
Status: DISCONTINUED | OUTPATIENT
Start: 2023-06-13 | End: 2023-06-15 | Stop reason: HOSPADM

## 2023-06-13 RX ORDER — ONDANSETRON 2 MG/ML
4 INJECTION INTRAMUSCULAR; INTRAVENOUS
Status: DISCONTINUED | OUTPATIENT
Start: 2023-06-13 | End: 2023-06-13 | Stop reason: HOSPADM

## 2023-06-13 RX ORDER — BISACODYL 10 MG
10 SUPPOSITORY, RECTAL RECTAL DAILY PRN
Status: DISCONTINUED | OUTPATIENT
Start: 2023-06-13 | End: 2023-06-15 | Stop reason: HOSPADM

## 2023-06-13 RX ORDER — AMLODIPINE BESYLATE 5 MG/1
5 TABLET ORAL DAILY
Status: DISCONTINUED | OUTPATIENT
Start: 2023-06-13 | End: 2023-06-15 | Stop reason: HOSPADM

## 2023-06-13 RX ORDER — OXYCODONE HYDROCHLORIDE 5 MG/1
5 TABLET ORAL
Status: DISCONTINUED | OUTPATIENT
Start: 2023-06-13 | End: 2023-06-15 | Stop reason: HOSPADM

## 2023-06-13 RX ORDER — DIPHENHYDRAMINE HYDROCHLORIDE 50 MG/ML
12.5 INJECTION INTRAMUSCULAR; INTRAVENOUS
Status: DISCONTINUED | OUTPATIENT
Start: 2023-06-13 | End: 2023-06-13 | Stop reason: HOSPADM

## 2023-06-13 RX ORDER — VITAMIN B COMPLEX
2000 TABLET ORAL DAILY
Status: DISCONTINUED | OUTPATIENT
Start: 2023-06-13 | End: 2023-06-15 | Stop reason: HOSPADM

## 2023-06-13 RX ORDER — SODIUM CHLORIDE, SODIUM LACTATE, POTASSIUM CHLORIDE, CALCIUM CHLORIDE 600; 310; 30; 20 MG/100ML; MG/100ML; MG/100ML; MG/100ML
INJECTION, SOLUTION INTRAVENOUS CONTINUOUS
Status: DISCONTINUED | OUTPATIENT
Start: 2023-06-13 | End: 2023-06-15 | Stop reason: HOSPADM

## 2023-06-13 RX ORDER — POLYETHYLENE GLYCOL 3350 17 G/17G
17 POWDER, FOR SOLUTION ORAL DAILY
Status: DISCONTINUED | OUTPATIENT
Start: 2023-06-13 | End: 2023-06-15 | Stop reason: HOSPADM

## 2023-06-13 RX ORDER — ACETAMINOPHEN 650 MG/1
650 SUPPOSITORY RECTAL EVERY 6 HOURS PRN
Status: DISCONTINUED | OUTPATIENT
Start: 2023-06-13 | End: 2023-06-15 | Stop reason: HOSPADM

## 2023-06-13 RX ORDER — KETOROLAC TROMETHAMINE 5 MG/ML
1 SOLUTION OPHTHALMIC 4 TIMES DAILY
Status: DISCONTINUED | OUTPATIENT
Start: 2023-06-13 | End: 2023-06-15 | Stop reason: HOSPADM

## 2023-06-13 RX ORDER — DIPHENHYDRAMINE HYDROCHLORIDE 50 MG/ML
12.5 INJECTION INTRAMUSCULAR; INTRAVENOUS EVERY 6 HOURS PRN
Status: DISCONTINUED | OUTPATIENT
Start: 2023-06-13 | End: 2023-06-15 | Stop reason: HOSPADM

## 2023-06-13 RX ADMIN — SODIUM CHLORIDE, PRESERVATIVE FREE 10 ML: 5 INJECTION INTRAVENOUS at 20:11

## 2023-06-13 RX ADMIN — DIPHENHYDRAMINE HYDROCHLORIDE 25 MG: 25 CAPSULE ORAL at 16:10

## 2023-06-13 RX ADMIN — Medication 2000 UNITS: at 15:40

## 2023-06-13 RX ADMIN — DOCUSATE SODIUM 50MG AND SENNOSIDES 8.6MG 1 TABLET: 8.6; 5 TABLET, FILM COATED ORAL at 15:40

## 2023-06-13 RX ADMIN — SODIUM CHLORIDE: 9 INJECTION, SOLUTION INTRAVENOUS at 20:13

## 2023-06-13 RX ADMIN — Medication: at 13:48

## 2023-06-13 RX ADMIN — SODIUM CHLORIDE: 9 INJECTION, SOLUTION INTRAVENOUS at 14:24

## 2023-06-13 RX ADMIN — CYCLOBENZAPRINE 10 MG: 10 TABLET, FILM COATED ORAL at 15:40

## 2023-06-13 RX ADMIN — CEFAZOLIN 2000 MG: 1 INJECTION, POWDER, FOR SOLUTION INTRAMUSCULAR; INTRAVENOUS at 17:58

## 2023-06-13 RX ADMIN — SODIUM CHLORIDE: 9 INJECTION, SOLUTION INTRAVENOUS at 13:38

## 2023-06-13 RX ADMIN — KETOROLAC TROMETHAMINE 1 DROP: 0.5 SOLUTION OPHTHALMIC at 20:11

## 2023-06-13 RX ADMIN — DOCUSATE SODIUM 50MG AND SENNOSIDES 8.6MG 1 TABLET: 8.6; 5 TABLET, FILM COATED ORAL at 20:11

## 2023-06-13 RX ADMIN — KETOROLAC TROMETHAMINE 1 DROP: 0.5 SOLUTION OPHTHALMIC at 15:58

## 2023-06-13 RX ADMIN — POLYETHYLENE GLYCOL 3350 17 G: 17 POWDER, FOR SOLUTION ORAL at 15:40

## 2023-06-13 RX ADMIN — OXYCODONE HYDROCHLORIDE 5 MG: 5 TABLET ORAL at 16:10

## 2023-06-13 ASSESSMENT — PAIN SCALES - GENERAL
PAINLEVEL_OUTOF10: 5
PAINLEVEL_OUTOF10: 6
PAINLEVEL_OUTOF10: 2
PAINLEVEL_OUTOF10: 0

## 2023-06-13 ASSESSMENT — PAIN DESCRIPTION - LOCATION
LOCATION: BACK
LOCATION: BACK
LOCATION: EYE;HEAD

## 2023-06-13 ASSESSMENT — PAIN DESCRIPTION - DESCRIPTORS
DESCRIPTORS: SORE
DESCRIPTORS: SORE

## 2023-06-13 ASSESSMENT — PAIN - FUNCTIONAL ASSESSMENT: PAIN_FUNCTIONAL_ASSESSMENT: 0-10

## 2023-06-13 ASSESSMENT — PAIN DESCRIPTION - PAIN TYPE
TYPE: SURGICAL PAIN
TYPE: SURGICAL PAIN

## 2023-06-13 NOTE — PERIOP NOTE
TRANSFER - OUT REPORT:    Verbal report given to [facility]. RN on [employer]  being transferred to [address] [place] [address] for routine post-op       Report consisted of patient's Situation, Background, Assessment and   Recommendations(SBAR). Information from the following report(s) Nurse Handoff Report was reviewed with the receiving nurse. Lines:   Peripheral IV 06/13/23 Left Wrist (Active)   Site Assessment Clean, dry & intact 06/13/23 1245   Line Status Infusing 06/13/23 [address] Connections checked and tightened 06/13/23 1245   Phlebitis Assessment No symptoms 06/13/23 1245   Infiltration Assessment 0 06/13/23 1245   Alcohol Cap Used Yes 06/13/23 1245   Dressing Status Clean, dry & intact 06/13/23 1245   Dressing Type Transparent 06/13/23 1245        Opportunity for questions and clarification was provided.       Patient transported with:  Registered Nurse

## 2023-06-13 NOTE — OP NOTE
Operative Note      Patient: Geovanny Metz  YOB: 1955  MRN: [id number]    Date of Procedure: 6/13/2023    Pre-Op Diagnosis Codes: * Spondylolisthesis of lumbar region [M43.16]    Post-Op Diagnosis: Same       Procedure(s):  L4-5 LAMINECTOMY/FUSION INSTRUMENTATION/TRANSFORAMINAL LUMBAR INTERBODY FUSION/BONE GRAFT    Surgeon(s):  Keisha Fulton MD    Assistant:   Surgical Assistant: Maria Elena Bradford  Physician Assistant: JESSICA Reyes    Anesthesia: General    Estimated Blood Loss (mL): 121     Complications: None    Specimens:   * No specimens in log *    Implants:  Implant Name Type Inv. Item Serial No.  Lot No. LRB No. Used Action   GRAFT DURA [id number] ULTRAPURE POR SUTURABLE DURAGN - [id number]  GRAFT DURA [id number] ULTRAPURE POR SUTURABLE DURAGN [id number] [employer]- NA N/A 1 Implanted   [id number] - [id number]   [id number]  NA N/A 1 Implanted   [id number] - [id number]   [id number]  NA N/A 1 Implanted   SPACER SPNL 12X22 MM BULL TIP IF PEEK VBR - SNA  SPACER SPNL 12X22 MM BULL TIP IF PEEK VBR NA [employer]- [id number] N/A 1 Implanted   SET SCR SPNL TI STREAMLINE - SNA  SET SCR SPNL TI STREAMLINE NA [employer] SPINE [place] NA N/A 4 Implanted   SCREW SPNL L50MM DIA6.5MM THORLUM TI ALLY POLYAX STREAMLINE - SNA  SCREW SPNL L50MM DIA6.5MM THORLUM TI ALLY POLYAX STREAMLINE NA [employer] SPINE [employer] NA N/A 2 Implanted   SCREW SPNL L45MM DIA6.5MM 60DEG THORLUM PEDCL TI ALLY - SNA  SCREW SPNL L45MM DIA6.5MM 60DEG THORLUM PEDCL TI ALLY NA [employer] SPINE [employer] NA N/A 2 Implanted   ARGENIS TI PREBENT 5.5X40 - SNA  ARGENIS TI PREBENT 5.5X40 NA [employer] SPINE [place] NA N/A 2 Implanted         Drains:   Closed/Suction Drain Posterior Back Accordion (Active)       Urinary Catheter 06/13/23 2 Way (Active)       Findings: as above        Detailed Description of Procedure:    [facility]  [facility]

## 2023-06-13 NOTE — CARE COORDINATION
6/13/2023  2:54 PM  Case management note    Reason for Admission:  elective back surgery    Patient came to hospital for Laminectomy. Patient is , independent with ADL's and drives. Daughter is here from [place] to assist at home. Patient has history of HTN  [employer] [facility] [place]  EVAL done via daughter                     RUR Score:                     Plan for utilizing home health:      PT/OT to eval    PCP: First and Last name:  Abdirashid Solis MD     Name of Practice:    Are you a current patient: Yes/No:    Approximate date of last visit:    Can you participate in a virtual visit with your PCP:                     Current Advanced Directive/Advance Care Plan: Full Code No additional code details      Healthcare Decision Maker:   Chiki Liao spouse                     Transition of Care Plan:                      Unable to determine at this time  PCP follow up  Ortho follow up  AD follow up  CM to follow for discharge planning     06/13/23 1459   Service Assessment   Patient Orientation Alert and Oriented   Cognition Alert   History Provided By Child/Family   Primary Caregiver Self   Accompanied By/Relationship spouse and daughter   Support Systems Spouse/Significant Other   [address] [facility] is: Legal Next of Renee 70   PCP Verified by CM Yes   Last Visit to PCP Within last 6 months   Prior Functional Level Independent in ADLs/IADLs   Current Functional Level Assistance with the following:;Bathing;Dressing;Cooking;Housework   Can patient return to prior living arrangement Yes   Ability to make needs known: Fair   Family able to assist with home care needs: Yes  (daughter is here from [place])   Would you like for me to discuss the discharge plan with any other family members/significant others, and if so, who?  Yes   Financial Resources None   Community Resources None     Dalton RRT, [address]

## 2023-06-13 NOTE — PROGRESS NOTES
[address] Reached out to anesthesiology and PACU regarding patient's R eye. Patient has visible drainage, redness, c/o pain. Was told orders for Toradol eye drops will be placed. Selamer Earing aware of patient status. [address] [facility] [address]. Jennifer Berrios at bedside to assess patient. Orders received for Toradol eye drops and NS eye wash out.  If not resided within 48 hours please contact MD.

## 2023-06-13 NOTE — H&P
Update History & Physical    The patient's History and Physical of June 4, 2023 was reviewed with the patient and I examined the patient. There was no change. The surgical site was confirmed by the patient and me. Plan: The risks, benefits, expected outcome, and alternative to the recommended procedure have been discussed with the patient. Patient understands and wants to proceed with the procedure.      Electronically signed by Louis Guillen MD on 6/13/2023 at 7:26 AM

## 2023-06-14 LAB
ANION GAP SERPL CALC-SCNC: 1 MMOL/L (ref 5–15)
BUN SERPL-MCNC: 9 MG/DL (ref 6–20)
BUN/CREAT SERPL: 15 (ref 12–20)
CALCIUM SERPL-MCNC: 8.2 MG/DL (ref 8.5–10.1)
CHLORIDE SERPL-SCNC: 112 MMOL/L (ref 97–108)
CO2 SERPL-SCNC: 26 MMOL/L (ref 21–32)
CREAT SERPL-MCNC: 0.62 MG/DL (ref 0.55–1.02)
GLUCOSE SERPL-MCNC: 130 MG/DL (ref 65–100)
HCT VFR BLD AUTO: 37.1 % (ref 35–47)
HGB BLD-MCNC: 12.2 G/DL (ref 11.5–16)
POTASSIUM SERPL-SCNC: 3.7 MMOL/L (ref 3.5–5.1)
SODIUM SERPL-SCNC: 139 MMOL/L (ref 136–145)

## 2023-06-14 PROCEDURE — 2580000003 HC RX 258: Performed by: ANESTHESIOLOGY

## 2023-06-14 PROCEDURE — 6360000002 HC RX W HCPCS: Performed by: ORTHOPAEDIC SURGERY

## 2023-06-14 PROCEDURE — 97530 THERAPEUTIC ACTIVITIES: CPT

## 2023-06-14 PROCEDURE — 97116 GAIT TRAINING THERAPY: CPT

## 2023-06-14 PROCEDURE — 2580000003 HC RX 258: Performed by: ORTHOPAEDIC SURGERY

## 2023-06-14 PROCEDURE — 80048 BASIC METABOLIC PNL TOTAL CA: CPT

## 2023-06-14 PROCEDURE — 36415 COLL VENOUS BLD VENIPUNCTURE: CPT

## 2023-06-14 PROCEDURE — 85014 HEMATOCRIT: CPT

## 2023-06-14 PROCEDURE — 97161 PT EVAL LOW COMPLEX 20 MIN: CPT

## 2023-06-14 PROCEDURE — 1100000000 HC RM PRIVATE

## 2023-06-14 PROCEDURE — 94761 N-INVAS EAR/PLS OXIMETRY MLT: CPT

## 2023-06-14 PROCEDURE — 6370000000 HC RX 637 (ALT 250 FOR IP): Performed by: ORTHOPAEDIC SURGERY

## 2023-06-14 PROCEDURE — 97165 OT EVAL LOW COMPLEX 30 MIN: CPT

## 2023-06-14 PROCEDURE — 97535 SELF CARE MNGMENT TRAINING: CPT

## 2023-06-14 PROCEDURE — 85018 HEMOGLOBIN: CPT

## 2023-06-14 RX ADMIN — SODIUM CHLORIDE, PRESERVATIVE FREE 10 ML: 5 INJECTION INTRAVENOUS at 08:16

## 2023-06-14 RX ADMIN — Medication 2000 UNITS: at 08:15

## 2023-06-14 RX ADMIN — SODIUM CHLORIDE, PRESERVATIVE FREE 10 ML: 5 INJECTION INTRAVENOUS at 20:32

## 2023-06-14 RX ADMIN — KETOROLAC TROMETHAMINE 1 DROP: 0.5 SOLUTION OPHTHALMIC at 08:16

## 2023-06-14 RX ADMIN — DOCUSATE SODIUM 50MG AND SENNOSIDES 8.6MG 1 TABLET: 8.6; 5 TABLET, FILM COATED ORAL at 20:32

## 2023-06-14 RX ADMIN — CEFAZOLIN 2000 MG: 1 INJECTION, POWDER, FOR SOLUTION INTRAMUSCULAR; INTRAVENOUS at 02:15

## 2023-06-14 RX ADMIN — Medication 200 MG: at 08:15

## 2023-06-14 RX ADMIN — SODIUM CHLORIDE, POTASSIUM CHLORIDE, SODIUM LACTATE AND CALCIUM CHLORIDE: 600; 310; 30; 20 INJECTION, SOLUTION INTRAVENOUS at 22:29

## 2023-06-14 RX ADMIN — POLYETHYLENE GLYCOL 3350 17 G: 17 POWDER, FOR SOLUTION ORAL at 08:15

## 2023-06-14 RX ADMIN — DOCUSATE SODIUM 50MG AND SENNOSIDES 8.6MG 1 TABLET: 8.6; 5 TABLET, FILM COATED ORAL at 08:15

## 2023-06-14 RX ADMIN — KETOROLAC TROMETHAMINE 1 DROP: 0.5 SOLUTION OPHTHALMIC at 16:56

## 2023-06-14 RX ADMIN — KETOROLAC TROMETHAMINE 1 DROP: 0.5 SOLUTION OPHTHALMIC at 20:32

## 2023-06-14 RX ADMIN — KETOROLAC TROMETHAMINE 1 DROP: 0.5 SOLUTION OPHTHALMIC at 13:06

## 2023-06-14 RX ADMIN — Medication: at 07:25

## 2023-06-14 RX ADMIN — AMLODIPINE BESYLATE 5 MG: 5 TABLET ORAL at 08:15

## 2023-06-14 ASSESSMENT — PAIN SCALES - GENERAL: PAINLEVEL_OUTOF10: 3

## 2023-06-14 NOTE — PROGRESS NOTES
Spiritual Care Partner Volunteer visited patient at [address] in [facility] 4M POST SURG ORT 1 on 6/14/2023    Documented by:  Edward 78, Griffin Ty 87, Tammy 69, [facility]  Staff [address]  Paging service: [phone] (JERI)

## 2023-06-14 NOTE — CARE COORDINATION
6/14/2023  3:33 PM  Care Management Progress Note    RUR:  6%  Risk Level: [x]Low []Moderate []High  Value-based purchasing: [] Yes [x] No  Bundle patient: [] Yes [x] No   Specify:     Transition of care plan:  Awaiting medical clearance and DC order. PT/OT treating. Pt still with drain. Home with family assistance as needed. Therapy indicated no home health or DME needs. Outpatient follow-up. Pt's family to transport.

## 2023-06-15 VITALS
TEMPERATURE: 98 F | HEART RATE: 82 BPM | OXYGEN SATURATION: 94 % | RESPIRATION RATE: 16 BRPM | HEIGHT: 63 IN | DIASTOLIC BLOOD PRESSURE: 83 MMHG | WEIGHT: 139.99 LBS | BODY MASS INDEX: 24.8 KG/M2 | SYSTOLIC BLOOD PRESSURE: 156 MMHG

## 2023-06-15 LAB
ANION GAP SERPL CALC-SCNC: 2 MMOL/L (ref 5–15)
BUN SERPL-MCNC: 9 MG/DL (ref 6–20)
BUN/CREAT SERPL: 20 (ref 12–20)
CALCIUM SERPL-MCNC: 8.4 MG/DL (ref 8.5–10.1)
CHLORIDE SERPL-SCNC: 108 MMOL/L (ref 97–108)
CO2 SERPL-SCNC: 27 MMOL/L (ref 21–32)
CREAT SERPL-MCNC: 0.44 MG/DL (ref 0.55–1.02)
GLUCOSE SERPL-MCNC: 135 MG/DL (ref 65–100)
HCT VFR BLD AUTO: 36.3 % (ref 35–47)
HGB BLD-MCNC: 12.2 G/DL (ref 11.5–16)
POTASSIUM SERPL-SCNC: 3.4 MMOL/L (ref 3.5–5.1)
SODIUM SERPL-SCNC: 137 MMOL/L (ref 136–145)

## 2023-06-15 PROCEDURE — 80048 BASIC METABOLIC PNL TOTAL CA: CPT

## 2023-06-15 PROCEDURE — 85014 HEMATOCRIT: CPT

## 2023-06-15 PROCEDURE — 2580000003 HC RX 258: Performed by: ORTHOPAEDIC SURGERY

## 2023-06-15 PROCEDURE — 36415 COLL VENOUS BLD VENIPUNCTURE: CPT

## 2023-06-15 PROCEDURE — 85018 HEMOGLOBIN: CPT

## 2023-06-15 PROCEDURE — 6370000000 HC RX 637 (ALT 250 FOR IP): Performed by: PHYSICIAN ASSISTANT

## 2023-06-15 PROCEDURE — 97116 GAIT TRAINING THERAPY: CPT

## 2023-06-15 PROCEDURE — 94761 N-INVAS EAR/PLS OXIMETRY MLT: CPT

## 2023-06-15 PROCEDURE — 6370000000 HC RX 637 (ALT 250 FOR IP): Performed by: ORTHOPAEDIC SURGERY

## 2023-06-15 RX ORDER — OXYCODONE HYDROCHLORIDE 5 MG/1
5-10 TABLET ORAL EVERY 4 HOURS PRN
Qty: 42 TABLET | Refills: 0 | Status: SHIPPED | OUTPATIENT
Start: 2023-06-15 | End: 2023-06-22

## 2023-06-15 RX ORDER — POTASSIUM CHLORIDE 750 MG/1
20 TABLET, FILM COATED, EXTENDED RELEASE ORAL ONCE
Status: COMPLETED | OUTPATIENT
Start: 2023-06-15 | End: 2023-06-15

## 2023-06-15 RX ORDER — SENNA AND DOCUSATE SODIUM 50; 8.6 MG/1; MG/1
1 TABLET, FILM COATED ORAL 2 TIMES DAILY
Qty: 60 TABLET | Refills: 0 | Status: SHIPPED | OUTPATIENT
Start: 2023-06-15

## 2023-06-15 RX ORDER — CYCLOBENZAPRINE HCL 10 MG
10 TABLET ORAL 3 TIMES DAILY PRN
Qty: 30 TABLET | Refills: 0 | Status: SHIPPED | OUTPATIENT
Start: 2023-06-15 | End: 2023-06-25

## 2023-06-15 RX ADMIN — KETOROLAC TROMETHAMINE 1 DROP: 0.5 SOLUTION OPHTHALMIC at 13:11

## 2023-06-15 RX ADMIN — Medication 2000 UNITS: at 08:27

## 2023-06-15 RX ADMIN — POLYETHYLENE GLYCOL 3350 17 G: 17 POWDER, FOR SOLUTION ORAL at 08:27

## 2023-06-15 RX ADMIN — OXYCODONE HYDROCHLORIDE 10 MG: 5 TABLET ORAL at 03:53

## 2023-06-15 RX ADMIN — Medication 200 MG: at 08:27

## 2023-06-15 RX ADMIN — SODIUM CHLORIDE, PRESERVATIVE FREE 10 ML: 5 INJECTION INTRAVENOUS at 08:27

## 2023-06-15 RX ADMIN — AMLODIPINE BESYLATE 5 MG: 5 TABLET ORAL at 08:27

## 2023-06-15 RX ADMIN — DOCUSATE SODIUM 50MG AND SENNOSIDES 8.6MG 1 TABLET: 8.6; 5 TABLET, FILM COATED ORAL at 08:27

## 2023-06-15 RX ADMIN — POTASSIUM CHLORIDE 20 MEQ: 750 TABLET, FILM COATED, EXTENDED RELEASE ORAL at 13:11

## 2023-06-15 RX ADMIN — KETOROLAC TROMETHAMINE 1 DROP: 0.5 SOLUTION OPHTHALMIC at 08:27

## 2023-06-15 ASSESSMENT — PAIN SCALES - GENERAL: PAINLEVEL_OUTOF10: 7

## 2023-06-15 ASSESSMENT — PAIN DESCRIPTION - DESCRIPTORS: DESCRIPTORS: SORE

## 2023-06-15 ASSESSMENT — PAIN DESCRIPTION - LOCATION: LOCATION: BACK

## 2023-06-15 NOTE — CARE COORDINATION
6/15/2023  3:17 PM  Care Management Progress Note      ICD-10-CM    1. Lumbar stenosis with neurogenic claudication  M48.062 oxyCODONE (ROXICODONE) 5 MG immediate release tablet          RUR:  6%  Risk Level: [x]Low []Moderate []High  Value-based purchasing: [] Yes [x] No  Bundle patient: [] Yes [x] No   Specify:     Transition of care plan:  Discharge order submitted. Pt has medically cleared. Home with family assistance as needed. No HH needs indicated. Outpatient follow-up.   Pt's family to transport.       06/14/23 1208   Social/Functional History   Bathroom Shower/Tub Tub/Shower unit   [employer] Help From [address]ad Andino [address] Responsibilities Yes   Ambulation Assistance Independent   Transfer Assistance Independent   Services At/After Discharge   Services At/After Discharge None

## 2023-06-15 NOTE — PLAN OF CARE
Problem: Pain  Goal: Verbalizes/displays adequate comfort level or baseline comfort level  6/14/2023 0525 by Yashira Salmon RN  Outcome: Progressing  6/13/2023 1557 by Sarai Slade LPN  Outcome: Progressing  Flowsheets (Taken 6/13/2023 1245 by Melina Amaya, RN)  Verbalizes/displays adequate comfort level or baseline comfort level: Encourage patient to monitor pain and request assistance     Problem: Discharge Planning  Goal: Discharge to home or other facility with appropriate resources  6/14/2023 0525 by Yashira Salmon RN  Outcome: Progressing  6/13/2023 1557 by Sarai Slade LPN  Outcome: Progressing  Flowsheets (Taken 6/13/2023 1400 by Melina Amaya, RN)  Discharge to home or other facility with appropriate resources: Identify barriers to discharge with patient and caregiver     Problem: ABCDS Injury Assessment  Goal: Absence of physical injury  6/14/2023 0525 by Yashira Salmon RN  Outcome: Progressing  6/13/2023 1557 by Sarai Slade LPN  Outcome: Progressing
Problem: Pain  Goal: Verbalizes/displays adequate comfort level or baseline comfort level  6/15/2023 0941 by Anastasiya Denise RN  Outcome: Progressing  6/15/2023 0807 by Elaina Shaw RN  Outcome: Progressing     Problem: Discharge Planning  Goal: Discharge to home or other facility with appropriate resources  6/15/2023 0941 by Anastasiya Denise RN  Outcome: Progressing  6/15/2023 0807 by Elaina Shaw RN  Outcome: Progressing     Problem: ABCDS Injury Assessment  Goal: Absence of physical injury  6/15/2023 0941 by Anastasiya Denise RN  Outcome: Progressing  6/15/2023 0807 by Elaina Shaw RN  Outcome: Progressing     Problem: Physical Therapy - Adult  Goal: By Discharge: Performs mobility at highest level of function for planned discharge setting. See evaluation for individualized goals. Description: FUNCTIONAL STATUS PRIOR TO ADMISSION: Patient was independent and active without use of DME.    HOME SUPPORT PRIOR TO ADMISSION: The patient lived with family but did not require assistance. Physical Therapy Goals  Initiated 6/14/2023  1. Patient will move from supine to sit and sit to supine, scoot up and down, and roll side to side in bed with independence within 7 day(s). 2.  Patient will perform sit to stand with independence within 7 day(s). 3.  Patient will transfer from bed to chair and chair to bed with independence using the least restrictive device within 7 day(s). 4.  Patient will ambulate with independence for 400 feet with the least restrictive device within 7 day(s). 5.  Patient will ascend/descend 4 stairs with  handrail(s) with independence within 7 day(s).    6/15/2023 0855 by Nena Canada PTA  Outcome: Progressing
Problem: Pain  Goal: Verbalizes/displays adequate comfort level or baseline comfort level  Outcome: Progressing     Problem: Discharge Planning  Goal: Discharge to home or other facility with appropriate resources  Outcome: Progressing     Problem: ABCDS Injury Assessment  Goal: Absence of physical injury  Outcome: Progressing
Problem: Pain  Goal: Verbalizes/displays adequate comfort level or baseline comfort level  Outcome: Progressing  Flowsheets (Taken 6/13/2023 1245 by Rodger Arroyo, RN)  Verbalizes/displays adequate comfort level or baseline comfort level: Encourage patient to monitor pain and request assistance     Problem: Discharge Planning  Goal: Discharge to home or other facility with appropriate resources  Outcome: Progressing  Flowsheets (Taken 6/13/2023 1400 by Rodger Arroyo, RN)  Discharge to home or other facility with appropriate resources: Identify barriers to discharge with patient and caregiver     Problem: ABCDS Injury Assessment  Goal: Absence of physical injury  Outcome: Progressing
Problem: Pain  Goal: Verbalizes/displays adequate comfort level or baseline comfort level  Recent Flowsheet Documentation  Taken 6/13/2023 1245 by Radha Bernstein RN  Verbalizes/displays adequate comfort level or baseline comfort level: Encourage patient to monitor pain and request assistance     Problem: Discharge Planning  Goal: Discharge to home or other facility with appropriate resources  Recent Flowsheet Documentation  Taken 6/13/2023 1400 by Radha Bernstein RN  Discharge to home or other facility with appropriate resources: Identify barriers to discharge with patient and caregiver
Problem: Physical Therapy - Adult  Goal: By Discharge: Performs mobility at highest level of function for planned discharge setting. See evaluation for individualized goals. Description: FUNCTIONAL STATUS PRIOR TO ADMISSION: Patient was independent and active without use of DME.    HOME SUPPORT PRIOR TO ADMISSION: The patient lived with family but did not require assistance. Physical Therapy Goals  Initiated 6/14/2023  1. Patient will move from supine to sit and sit to supine, scoot up and down, and roll side to side in bed with independence within 7 day(s). 2.  Patient will perform sit to stand with independence within 7 day(s). 3.  Patient will transfer from bed to chair and chair to bed with independence using the least restrictive device within 7 day(s). 4.  Patient will ambulate with independence for 400 feet with the least restrictive device within 7 day(s). 5.  Patient will ascend/descend 4 stairs with  handrail(s) with independence within 7 day(s). 6/14/2023 1520 by Nat Nicholson PT  Outcome: Progressing   PHYSICAL THERAPY TREATMENT    Patient: Rhoda Moreland (77 y.o. female)  Date: 6/14/2023  Diagnosis: Spondylolisthesis of lumbar region [M43.16]  Lumbar stenosis with neurogenic claudication [M48.062] Lumbar stenosis with neurogenic claudication  Procedure(s) (LRB):  L4-5 LAMINECTOMY/FUSION INSTRUMENTATION/TRANSFORAMINAL LUMBAR INTERBODY FUSION/BONE GRAFT (N/A) 1 Day Post-Op  Precautions:           Spinal Precautions: No Bending, No Lifting, No Twisting          ASSESSMENT:  Patient continues to benefit from skilled PT services and is progressing towards goals. Communicated with nurse cleared for therapy. Patient supine on bed when received. Reviewed back precautions and donning of back brace verbalized  understanding. Sit on the edge of bed modified independent, ambulate without assistive device out on the hallway, up and down stairs supervision.  No loss of balance steady sitting and
Problem: Physical Therapy - Adult  Goal: By Discharge: Performs mobility at highest level of function for planned discharge setting. See evaluation for individualized goals. Description: FUNCTIONAL STATUS PRIOR TO ADMISSION: Patient was independent and active without use of DME.    HOME SUPPORT PRIOR TO ADMISSION: The patient lived with family but did not require assistance. Physical Therapy Goals  Initiated 6/14/2023  1. Patient will move from supine to sit and sit to supine, scoot up and down, and roll side to side in bed with independence within 7 day(s). 2.  Patient will perform sit to stand with independence within 7 day(s). 3.  Patient will transfer from bed to chair and chair to bed with independence using the least restrictive device within 7 day(s). 4.  Patient will ambulate with independence for 400 feet with the least restrictive device within 7 day(s). 5.  Patient will ascend/descend 4 stairs with  handrail(s) with independence within 7 day(s). Outcome: Progressing   PHYSICAL THERAPY EVALUATION    Patient: Everette Bey (75 y.o. female)  Date: 6/14/2023  Primary Diagnosis: Spondylolisthesis of lumbar region [M43.16]  Lumbar stenosis with neurogenic claudication [M48.062]  Procedure(s) (LRB):  L4-5 LAMINECTOMY/FUSION INSTRUMENTATION/TRANSFORAMINAL LUMBAR INTERBODY FUSION/BONE GRAFT (N/A) 1 Day Post-Op   Precautions:           Spinal Precautions: No Bending, No Lifting, No Twisting          ASSESSMENT :   DEFICITS/IMPAIRMENTS:   The patient is limited by decreased functional mobility, independence in ADLs, safety awareness, coordination, balance, increased pain levels     Based on the impairments listed above presents with difficulty with ambulation. Communicated with nurse cleared for therapy. Patient supine on bed when received. Reviewed back precautions and donning of back brace verbalized understanding.  Sit on the edge of bed min assist, ambulate in the room and out on the 4th floor
Problem: Physical Therapy - Adult  Goal: By Discharge: Performs mobility at highest level of function for planned discharge setting. See evaluation for individualized goals. Description: FUNCTIONAL STATUS PRIOR TO ADMISSION: Patient was independent and active without use of DME.    HOME SUPPORT PRIOR TO ADMISSION: The patient lived with family but did not require assistance. Physical Therapy Goals  Initiated 6/14/2023  1. Patient will move from supine to sit and sit to supine, scoot up and down, and roll side to side in bed with independence within 7 day(s). 2.  Patient will perform sit to stand with independence within 7 day(s). 3.  Patient will transfer from bed to chair and chair to bed with independence using the least restrictive device within 7 day(s). 4.  Patient will ambulate with independence for 400 feet with the least restrictive device within 7 day(s). 5.  Patient will ascend/descend 4 stairs with  handrail(s) with independence within 7 day(s). Outcome: Progressing   PHYSICAL THERAPY TREATMENT    Patient: Greta Nascimento (54 y.o. female)  Date: 6/15/2023  Diagnosis: Spondylolisthesis of lumbar region [M43.16]  Lumbar stenosis with neurogenic claudication [M48.062] Lumbar stenosis with neurogenic claudication  Procedure(s) (LRB):  L4-5 LAMINECTOMY/FUSION INSTRUMENTATION/TRANSFORAMINAL LUMBAR INTERBODY FUSION/BONE GRAFT (N/A) 2 Days Post-Op  Precautions:           Spinal Precautions: No Bending, No Lifting, No Twisting          ASSESSMENT:  Patient continues to benefit from skilled PT services and is progressing towards goals. Pt verbalized understanding of back precautions. Able to don LSO and understanding of wear schedule. SBA for functional tranfers and gait training with no LOB. Spouse present and supportive throughout session. Pt is cleared for discharge from PT standpoint when medically stable. Hemovac still in place.          PLAN:  Patient continues to benefit from skilled intervention
Complexity : Brief history review  LOW Complexity: 1-3 Performance deficits relating to physical, cognitive, or psychosocial skills that result in activity limitations and/or participation restrictions LOW Complexity: No comorbidities that affect functional and  no verbal  or physical assist needed to complete eval tasks   Based on the above components, the patient evaluation is determined to be of the following complexity level: Low

## 2023-06-15 NOTE — PROGRESS NOTES
ORTHOPAEDIC LUMBAR FUSION PROGRESS NOTE    NAME:     Brooke Vargas   :       1955   MRN:       [id number]   DATE:      2023    POD:    1 Days Post-Op  S/P:    Procedure(s):  L4-5 LAMINECTOMY/FUSION INSTRUMENTATION/TRANSFORAMINAL LUMBAR INTERBODY FUSION/BONE GRAFT    SUBJECTIVE:    C/O back pain along surgical incision  No leg pain or numbness  Denies nausea/vomiting, headache, chest pain or shortness of breath  Pain controlled     Latest Reference Range & Units 23 03:09   Sodium 136 - 145 mmol/L 139   Potassium 3.5 - 5.1 mmol/L 3.7   Chloride 97 - 108 mmol/L 112 (H)   CO2 21 - 32 mmol/L 26   BUN,BUNPL 6 - 20 MG/DL 9   Creatinine 0.55 - 1.02 MG/DL 0.62   Bun/Cre Ratio 12 - 20   15   Anion Gap 5 - 15 mmol/L 1 (L)   Est, Glom Filt Rate >60 ml/min/1.73m2 >60   Glucose, Random 65 - 100 mg/dL 130 (H)   CALCIUM, SERUM, 617762 8.5 - 10.1 MG/DL 8.2 (L)   Hemoglobin Quant 11.5 - 16.0 g/dL 12.2   Hematocrit 35.0 - 47.0 % 37.1   (H): Data is abnormally high  (L): Data is abnormally low      23 1119 98.3 (36.8) 16 73 163/89 -- Supine -- -- 98 None (Room air) -- -- -- -- -- -- -- -- -- --      23 0815 -- -- -- -- -- -- -- -- -- -- 3  -- -- -- -- -- -- -- -- --     Pain Level: \"pain ok without movement\" at 23 0815     23 0739 97.7 (36.5) 16 75 152/81 -- Supine -- -- 98 None (Room air) -- -- -- -- -- -- -- -- -- --     23 0432 98.2 (36.8) 19 80 135/86 -- -- -- -- 93 -- -- -- -- -- -- -- -- -- --      EXAM:  Dressings clean, dry and intact   Positive strength/ROM bilat lower ext.   Neuro intact to sensation  Calves, soft & nontender  BL LEs NVID      PLAN:  D/C PCA, change to prn PO pain medications  Monitor hemovac drain output  Maintain SCDs  PT/OT, OOB w/ assist  Advance diet as tolerated      [place], [place]  Orthopaedic Surgery  Physician Assistant to Dr. Carmen Hawk

## 2023-06-15 NOTE — DISCHARGE INSTRUCTIONS
Lumbar Spinal Surgery Discharge Instructions   Dr. Nilton Ray  [phone]    Activity:    You are going home a well person, be as active as possible. Your only exercise should be walking. Start with short frequent walks and increase your walking distance each day. Start with walking twice a day for 5 minutes and increase your distance each day 2-3 minutes until you reach 25 minutes twice a day. Limit the amount of time you sit to 20-30 minute intervals. Sitting for prolonged periods of time will be uncomfortable for you following your surgery. No bending, lifting (of 5lbs or more), twisting, or straining. Do not drive until your doctor states you may do so. However, you may ride in a car for short distances. If you are required to wear a brace, you should wear it at all times when you are out of bed. You may remove it when sleeping unless your physician advises you against it. When you are in the bed, you may lay on your back or on either side. Do not lie on your stomach. You may resume sexual relations 3-4 weeks after surgery depending on how you are feeling. Continue to use your incentive spirometer for deep breathing exercises. Driving: You may not drive or return to work until instructed by your physician. However, you may ride in the car for short periods of time. Brace: If you have a back brace, you should wear your brace at all times when you are out of bed. Do not wear the brace while in bed or showering. Remember to always wear a cotton t-shirt underneath your brace. Do not bend or twist when your brace is off. Diet:  You may resume your regular home diet as tolerated. If your throat is sore, you should eat soft foods for the first couple of days. Be sure to drink plenty of fluids; it is important to keep yourself hydrated. Avoid alcoholic beverages and ABSOLUTELY NO tobacco products. Tobacco products will interfere with your healing.  If

## 2023-06-15 NOTE — PROGRESS NOTES
Pt up ad jalyn in room, family at bedside. Pt has successfully cleared PT. Encouraged ambulation with family as tolerated.

## 2023-06-15 NOTE — PROGRESS NOTES
ORTHOPAEDIC LUMBAR FUSION PROGRESS NOTE    NAME:     Ludy Bentley   :       1955   MRN:       [id number]   DATE:      6/15/2023    POD:    2 Days Post-Op  S/P:    Procedure(s):  L4-5 LAMINECTOMY/FUSION INSTRUMENTATION/TRANSFORAMINAL LUMBAR INTERBODY FUSION/BONE GRAFT    SUBJECTIVE:    C/O back pain along surgical incision  No leg pain or numbness  Denies nausea/vomiting, headache, chest pain or shortness of breath  Pain controlled    Recent Labs     06/15/23  0355   HGB 12.2   HCT 36.3      K 3.4*      CO2 27   BUN 9     Patient Vitals for the past 12 hrs:   BP Temp Temp src Pulse Resp SpO2   06/15/23 1132 (!) 156/83 98 °F (36.7 °C) Oral 82 16 94 %   06/15/23 0809 (!) 145/82 97.5 °F (36.4 °C) Oral 76 18 93 %   06/15/23 0423 -- -- -- -- 18 --   06/15/23 0408 (!) 155/75 97.9 °F (36.6 °C) Oral 76 19 93 %       EXAM:  Dressings clean, dry and intact   Positive strength/ROM bilat lower ext.   Neuro intact to sensation  Calves, soft & nontender  BL LEs NVID      PLAN:  Continue prn PO pain medications  Monitor hemovac drain output  Maintain SCDs  PT/OT, OOB w/ assist  Tolerating diet      Gordon Reis [place]  Orthopaedic Surgery  Physician Assistant to Dr. Karrie Mazariegos

## 2023-06-15 NOTE — PROGRESS NOTES
Occupational Therapy Note:Pt with no further questions/concerns for OT. Daughter asking about showering and told pt protocol for this at hospital.She is clear from an OT standpoint when medically stable.

## 2023-06-15 NOTE — PROGRESS NOTES
Discharge AVS reviewed with pt and with her son. All medications reviewed individually with patient. Opportunities for questions and concerns provided.

## 2023-06-15 NOTE — PROGRESS NOTES
Leader rounding and   Rounded on patient,   Patient did not participate in class. Has spine patient education book. Patient states their home space is prepared and safe. Reviewed incentive spirometry use   Call, don't fall expectations reviewed with patient  Opportunity provided for patient to ask questions and provide comments. Questions answered.  at bedside.

## 2023-06-15 NOTE — PROGRESS NOTES
Patient meets discharge criteria. Has cleared PT. Drain output over last 8 hours was 30cc. Ok to remove hemovac and discharge patient home. Have discussed case with JESSICA Zhang who evaluated patient this morning. Discharge orders placed accordingly.     DAQUAN Zacarias - NP

## 2023-06-24 DIAGNOSIS — M48.07 SPINAL STENOSIS, LUMBOSACRAL REGION: Primary | ICD-10-CM

## 2023-06-26 RX ORDER — GABAPENTIN 100 MG/1
CAPSULE ORAL
Qty: 60 CAPSULE | Refills: 5 | Status: SHIPPED | OUTPATIENT
Start: 2023-06-26 | End: 2023-07-26

## 2023-07-24 ENCOUNTER — HOSPITAL ENCOUNTER (OUTPATIENT)
Facility: HOSPITAL | Age: 68
Discharge: HOME | End: 2023-07-27
Payer: MEDICARE

## 2023-07-24 DIAGNOSIS — Z78.0 POSTMENOPAUSAL: ICD-10-CM

## 2023-07-24 DIAGNOSIS — Z12.31 ENCOUNTER FOR SCREENING MAMMOGRAM FOR BREAST CANCER: ICD-10-CM

## 2023-07-24 DIAGNOSIS — Z13.820 SCREENING FOR OSTEOPOROSIS: ICD-10-CM

## 2023-07-24 PROCEDURE — 77063 BREAST TOMOSYNTHESIS BI: CPT

## 2023-07-27 ENCOUNTER — HOSPITAL ENCOUNTER (OUTPATIENT)
Facility: HOSPITAL | Age: 68
Discharge: HOME | End: 2023-07-27
Attending: FAMILY MEDICINE
Payer: MEDICARE

## 2023-07-27 ENCOUNTER — TELEPHONE (OUTPATIENT)
Age: 68
End: 2023-07-27

## 2023-07-27 PROCEDURE — 77080 DXA BONE DENSITY AXIAL: CPT

## 2023-07-27 RX ORDER — AMLODIPINE BESYLATE 5 MG/1
5 TABLET ORAL DAILY
Qty: 30 TABLET | Refills: 0 | Status: SHIPPED | OUTPATIENT
Start: 2023-07-27

## 2023-07-27 NOTE — TELEPHONE ENCOUNTER
[phone] Patient would like a refill on Amlodipine 5mg. Patient is out of amlodipine    [facility].

## 2023-08-15 RX ORDER — CHLORAL HYDRATE 500 MG
1000 CAPSULE ORAL DAILY
COMMUNITY

## 2023-08-16 ENCOUNTER — ANESTHESIA (OUTPATIENT)
Facility: HOSPITAL | Age: 68
End: 2023-08-16
Payer: MEDICARE

## 2023-08-16 ENCOUNTER — ANESTHESIA EVENT (OUTPATIENT)
Facility: HOSPITAL | Age: 68
End: 2023-08-16
Payer: MEDICARE

## 2023-08-16 ENCOUNTER — HOSPITAL ENCOUNTER (OUTPATIENT)
Facility: HOSPITAL | Age: 68
Setting detail: OUTPATIENT SURGERY
Discharge: HOME | End: 2023-08-16
Attending: SPECIALIST | Admitting: SPECIALIST
Payer: MEDICARE

## 2023-08-16 VITALS
TEMPERATURE: 98.1 F | DIASTOLIC BLOOD PRESSURE: 68 MMHG | BODY MASS INDEX: 24.98 KG/M2 | RESPIRATION RATE: 18 BRPM | SYSTOLIC BLOOD PRESSURE: 131 MMHG | HEIGHT: 63 IN | WEIGHT: 141 LBS | HEART RATE: 65 BPM | OXYGEN SATURATION: 95 %

## 2023-08-16 PROCEDURE — 2720000010 HC SURG SUPPLY STERILE: Performed by: SPECIALIST

## 2023-08-16 PROCEDURE — 3600007512: Performed by: SPECIALIST

## 2023-08-16 PROCEDURE — 7100000010 HC PHASE II RECOVERY - FIRST 15 MIN: Performed by: SPECIALIST

## 2023-08-16 PROCEDURE — 88305 TISSUE EXAM BY PATHOLOGIST: CPT

## 2023-08-16 PROCEDURE — 2580000003 HC RX 258: Performed by: SPECIALIST

## 2023-08-16 PROCEDURE — 3700000001 HC ADD 15 MINUTES (ANESTHESIA): Performed by: SPECIALIST

## 2023-08-16 PROCEDURE — 3700000000 HC ANESTHESIA ATTENDED CARE: Performed by: SPECIALIST

## 2023-08-16 PROCEDURE — 2500000003 HC RX 250 WO HCPCS: Performed by: NURSE ANESTHETIST, CERTIFIED REGISTERED

## 2023-08-16 PROCEDURE — C1889 IMPLANT/INSERT DEVICE, NOC: HCPCS | Performed by: SPECIALIST

## 2023-08-16 PROCEDURE — 6360000002 HC RX W HCPCS: Performed by: NURSE ANESTHETIST, CERTIFIED REGISTERED

## 2023-08-16 PROCEDURE — 3600007502: Performed by: SPECIALIST

## 2023-08-16 PROCEDURE — 2709999900 HC NON-CHARGEABLE SUPPLY: Performed by: SPECIALIST

## 2023-08-16 RX ORDER — SODIUM CHLORIDE 0.9 % (FLUSH) 0.9 %
5-40 SYRINGE (ML) INJECTION EVERY 12 HOURS SCHEDULED
Status: DISCONTINUED | OUTPATIENT
Start: 2023-08-16 | End: 2023-08-16 | Stop reason: HOSPADM

## 2023-08-16 RX ORDER — SODIUM CHLORIDE 0.9 % (FLUSH) 0.9 %
5-40 SYRINGE (ML) INJECTION PRN
Status: DISCONTINUED | OUTPATIENT
Start: 2023-08-16 | End: 2023-08-16 | Stop reason: HOSPADM

## 2023-08-16 RX ORDER — SODIUM CHLORIDE 9 MG/ML
25 INJECTION, SOLUTION INTRAVENOUS PRN
Status: DISCONTINUED | OUTPATIENT
Start: 2023-08-16 | End: 2023-08-16 | Stop reason: HOSPADM

## 2023-08-16 RX ORDER — LIDOCAINE HYDROCHLORIDE 20 MG/ML
INJECTION, SOLUTION EPIDURAL; INFILTRATION; INTRACAUDAL; PERINEURAL PRN
Status: DISCONTINUED | OUTPATIENT
Start: 2023-08-16 | End: 2023-08-16 | Stop reason: SDUPTHER

## 2023-08-16 RX ADMIN — PROPOFOL 230 MG: 10 INJECTION, EMULSION INTRAVENOUS at 09:11

## 2023-08-16 RX ADMIN — SODIUM CHLORIDE: 9 INJECTION, SOLUTION INTRAVENOUS at 09:13

## 2023-08-16 RX ADMIN — SODIUM CHLORIDE 25 ML: 9 INJECTION, SOLUTION INTRAVENOUS at 07:44

## 2023-08-16 RX ADMIN — LIDOCAINE HYDROCHLORIDE 40 MG: 20 INJECTION, SOLUTION EPIDURAL; INFILTRATION; INTRACAUDAL; PERINEURAL at 08:49

## 2023-08-16 ASSESSMENT — PAIN - FUNCTIONAL ASSESSMENT: PAIN_FUNCTIONAL_ASSESSMENT: 0-10

## 2023-08-16 NOTE — DISCHARGE INSTRUCTIONS
Michelle Choudhury  [id number]  1955    COLON DISCHARGE INSTRUCTIONS  Discomfort:  Redness at IV site- apply warm compress to area; if redness or soreness persist- contact your physician  There may be a slight amount of blood passed from the rectum  Gaseous discomfort- walking, belching will help relieve any discomfort  You may not operate a vehicle for 12 hours  You may not engage in an occupation involving machinery or appliances for rest of today  You may not drink alcoholic beverages for at least 12 hours  Avoid making any critical decisions for at least 24 hour  DIET:   Regular diet. - however -  remember your colon is empty and a heavy meal will produce gas. Avoid these foods:  vegetables, fried / greasy foods, carbonated drinks for today. MEDICATIONS:        Regarding Aspirin or Nonsteroidal medications, please see below. ACTIVITY:  You may resume your normal daily activities it is recommended that you spend the remainder of the day resting -  avoid any strenuous activity. CALL M.D. ANY SIGN OF:  Increasing pain, nausea, vomiting  Abdominal distension (swelling)  New increased bleeding (oral or rectal)  Fever (chills)  Pain in chest area  Bloody discharge from nose or mouth  Shortness of breath  Tylenol as needed for pain.       Follow-up Instructions:   Call Dr. Yelena Flannery for results of procedure / biopsy in 4-5 days at telephone #  [phone]              Repeat Colonoscopy in 3 years      Findings:  (1) polyp removed  Ulcer in the splenic flexure (biopsy performed)  Internal hemorrhoids    Patient Education on Sedation / Analgesia Administered for Procedure      For 24 hours after general anesthesia or intravenous analgesia / sedation:  Have someone responsible help you with your care  Limit your activities  Do not drive and operate hazardous machinery  Do not make important personal, legal or business decisions  Do not drink alcoholic beverages  If you have not urinated within 8 hours after

## 2023-08-16 NOTE — ANESTHESIA POSTPROCEDURE EVALUATION
Department of Anesthesiology  Postprocedure Note    Patient: Adele Rouse  MRN: [id number]  YOB: 1955  Date of evaluation: 8/16/2023      Procedure Summary     Date: 08/16/23 Room / Location: [facility] ENDOSCOPY    Anesthesia Start: 0849 Anesthesia Stop: 0916    Procedure: COLONOSCOPY Diagnosis:       Personal history of colonic polyps      (Personal history of colonic polyps [Z86.010])    Surgeons: Miles Grider MD Responsible Provider: Anne-Marie Grimm MD    Anesthesia Type: MAC ASA Status: 2          Anesthesia Type: MAC    Jose Francisco Phase I: Jose Francisco Score: 10    Jose Francisco Phase II: Jose Francisco Score: 10      Anesthesia Post Evaluation    Patient location during evaluation: PACU  Patient participation: complete - patient participated  Level of consciousness: sleepy but conscious and responsive to verbal stimuli  Airway patency: patent  Nausea & Vomiting: no vomiting and no nausea  Complications: no  Cardiovascular status: blood pressure returned to baseline and hemodynamically stable  Respiratory status: acceptable  Hydration status: stable

## 2023-08-30 ENCOUNTER — OFFICE VISIT (OUTPATIENT)
Age: 68
End: 2023-08-30
Payer: MEDICARE

## 2023-08-30 VITALS
BODY MASS INDEX: 25.16 KG/M2 | HEIGHT: 63 IN | OXYGEN SATURATION: 95 % | SYSTOLIC BLOOD PRESSURE: 149 MMHG | RESPIRATION RATE: 18 BRPM | TEMPERATURE: 97.1 F | HEART RATE: 71 BPM | DIASTOLIC BLOOD PRESSURE: 87 MMHG | WEIGHT: 142 LBS

## 2023-08-30 DIAGNOSIS — I10 PRIMARY HYPERTENSION: Primary | ICD-10-CM

## 2023-08-30 DIAGNOSIS — Z98.890 HISTORY OF LUMBAR LAMINECTOMY FOR SPINAL CORD DECOMPRESSION: ICD-10-CM

## 2023-08-30 DIAGNOSIS — R23.2 HOT FLASHES: ICD-10-CM

## 2023-08-30 DIAGNOSIS — E78.2 MIXED HYPERLIPIDEMIA: ICD-10-CM

## 2023-08-30 DIAGNOSIS — R73.02 IGT (IMPAIRED GLUCOSE TOLERANCE): ICD-10-CM

## 2023-08-30 DIAGNOSIS — Z79.899 ENCOUNTER FOR LONG-TERM (CURRENT) USE OF MEDICATIONS: ICD-10-CM

## 2023-08-30 PROCEDURE — 1123F ACP DISCUSS/DSCN MKR DOCD: CPT | Performed by: FAMILY MEDICINE

## 2023-08-30 PROCEDURE — 99214 OFFICE O/P EST MOD 30 MIN: CPT | Performed by: FAMILY MEDICINE

## 2023-08-30 PROCEDURE — 3077F SYST BP >= 140 MM HG: CPT | Performed by: FAMILY MEDICINE

## 2023-08-30 PROCEDURE — 3079F DIAST BP 80-89 MM HG: CPT | Performed by: FAMILY MEDICINE

## 2023-08-30 RX ORDER — AMLODIPINE BESYLATE 5 MG/1
10 TABLET ORAL EVERY EVENING
Qty: 60 TABLET | Refills: 5 | Status: SHIPPED | OUTPATIENT
Start: 2023-08-30

## 2023-08-30 RX ORDER — CYCLOBENZAPRINE HCL 10 MG
TABLET ORAL
COMMUNITY
Start: 2023-06-15 | End: 2023-08-30 | Stop reason: ALTCHOICE

## 2023-08-30 RX ORDER — GABAPENTIN 100 MG/1
100 CAPSULE ORAL 2 TIMES DAILY
COMMUNITY
Start: 2023-04-25 | End: 2023-08-30

## 2023-08-30 RX ORDER — POLYETHYLENE GLYCOL 3350, SODIUM SULFATE ANHYDROUS, SODIUM BICARBONATE, SODIUM CHLORIDE, POTASSIUM CHLORIDE 236; 22.74; 6.74; 5.86; 2.97 G/4L; G/4L; G/4L; G/4L; G/4L
POWDER, FOR SOLUTION ORAL
COMMUNITY
Start: 2023-08-14 | End: 2023-08-30 | Stop reason: ALTCHOICE

## 2023-08-30 ASSESSMENT — PATIENT HEALTH QUESTIONNAIRE - PHQ9
SUM OF ALL RESPONSES TO PHQ QUESTIONS 1-9: 0
2. FEELING DOWN, DEPRESSED OR HOPELESS: 0
SUM OF ALL RESPONSES TO PHQ QUESTIONS 1-9: 0
SUM OF ALL RESPONSES TO PHQ QUESTIONS 1-9: 0
SUM OF ALL RESPONSES TO PHQ9 QUESTIONS 1 & 2: 0
1. LITTLE INTEREST OR PLEASURE IN DOING THINGS: 0
SUM OF ALL RESPONSES TO PHQ QUESTIONS 1-9: 0

## 2023-08-30 ASSESSMENT — ENCOUNTER SYMPTOMS
COUGH: 0
CHEST TIGHTNESS: 0
BLOOD IN STOOL: 0
RHINORRHEA: 0
SHORTNESS OF BREATH: 0
ABDOMINAL PAIN: 0
BACK PAIN: 1
CONSTIPATION: 0

## 2023-08-30 NOTE — PROGRESS NOTES
Subjective:      Patient ID: Soumya Harding is a 78 y.o. female. HPI  Follow up on chronic medical problems. Overall feeling well. Recovering well from her back surgery she on on last month. Has improved the pain that she was having in the lower back and left leg. She is back to work. Has spells of feeling hot thru out the day. Comes and goes. Not associated with any other sx. Has been going on over the past 2 weeks. Occurs mostly when she is at work. 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 Hypertension follow up:  Pt has htn and tolerating the amlodipine. She is not exercising due to her recent back surgery. Is adherent to a low-salt diet. Patient denies: chest pain, dyspnea, fatigue, lower extremity edema, near-syncope, and palpitations. Cardiovascular risk factors: advanced age (older than 54 for men, 72 for women). Use of agents associated with hypertension: none. History of target organ damage: none. HM:  Mammo: 7/24/2023  CLN: 8/16/2023  repeat in 3 years.      Past Medical History:   Diagnosis Date    Hyperlipidemia     Hypertension     Lumbar stenosis      Past Surgical History:   Procedure Laterality Date    COLONOSCOPY      COLONOSCOPY N/A 8/16/2023    COLONOSCOPY performed by Brandt Mcmahan MD at [facility] ENDOSCOPY    LUMBAR FUSION N/A 6/13/2023    L4-5 LAMINECTOMY/FUSION INSTRUMENTATION/TRANSFORAMINAL LUMBAR INTERBODY FUSION/BONE GRAFT performed by Eder Carbone MD at [address] [place] [address] Right     right ring finger surgery     Current Outpatient Medications   Medication Sig Dispense Refill    Omega-3 Fatty Acids (FISH OIL) 1000 MG capsule Take 1 capsule by mouth daily      amLODIPine (NORVASC) 5 MG tablet Take 1 tablet by mouth daily 30 tablet 0    magnesium (MAGNESIUM-OXIDE) 250 MG TABS tablet Take 1 tablet by mouth daily      Cholecalciferol (VITAMIN D3) 50 MCG (2000 UT) CAPS Take 1 capsule by mouth daily      CRANBERRY PO Take 2 tablets by mouth

## 2023-08-30 NOTE — PROGRESS NOTES
Chief Complaint   Patient presents with    Follow-up     Patient is here today for a follow up. 1. Have you been to the ER, urgent care clinic since your last visit? Hospitalized since your last visit? 8/16/23 [address] [place] [address] for colonoscopy    2. Have you seen or consulted any other health care providers outside of the [address] [place] [address] since your last visit? Include any pap smears or colon screening.  No

## 2023-08-31 LAB
ALBUMIN SERPL-MCNC: 3.8 G/DL (ref 3.5–5)
ALBUMIN/GLOB SERPL: 1.1 (ref 1.1–2.2)
ALP SERPL-CCNC: 98 U/L (ref 45–117)
ALT SERPL-CCNC: 32 U/L (ref 12–78)
ANION GAP SERPL CALC-SCNC: 6 MMOL/L (ref 5–15)
APPEARANCE UR: CLEAR
AST SERPL-CCNC: 16 U/L (ref 15–37)
BACTERIA URNS QL MICRO: NEGATIVE /HPF
BILIRUB SERPL-MCNC: 0.4 MG/DL (ref 0.2–1)
BILIRUB UR QL: NEGATIVE
BUN SERPL-MCNC: 17 MG/DL (ref 6–20)
BUN/CREAT SERPL: 24 (ref 12–20)
CALCIUM SERPL-MCNC: 9.4 MG/DL (ref 8.5–10.1)
CAOX CRY URNS QL MICRO: ABNORMAL
CHLORIDE SERPL-SCNC: 104 MMOL/L (ref 97–108)
CHOLEST SERPL-MCNC: 297 MG/DL
CO2 SERPL-SCNC: 28 MMOL/L (ref 21–32)
COLOR UR: ABNORMAL
CREAT SERPL-MCNC: 0.71 MG/DL (ref 0.55–1.02)
EPITH CASTS URNS QL MICRO: ABNORMAL /LPF
EST. AVERAGE GLUCOSE BLD GHB EST-MCNC: 120 MG/DL
GLOBULIN SER CALC-MCNC: 3.5 G/DL (ref 2–4)
GLUCOSE SERPL-MCNC: 110 MG/DL (ref 65–100)
GLUCOSE UR STRIP.AUTO-MCNC: NEGATIVE MG/DL
HBA1C MFR BLD: 5.8 % (ref 4–5.6)
HDLC SERPL-MCNC: 129 MG/DL
HDLC SERPL: 2.3 (ref 0–5)
HGB UR QL STRIP: NEGATIVE
KETONES UR QL STRIP.AUTO: NEGATIVE MG/DL
LDLC SERPL CALC-MCNC: 144.8 MG/DL (ref 0–100)
LEUKOCYTE ESTERASE UR QL STRIP.AUTO: ABNORMAL
NITRITE UR QL STRIP.AUTO: NEGATIVE
PH UR STRIP: 7 (ref 5–8)
POTASSIUM SERPL-SCNC: 4 MMOL/L (ref 3.5–5.1)
PROT SERPL-MCNC: 7.3 G/DL (ref 6.4–8.2)
PROT UR STRIP-MCNC: NEGATIVE MG/DL
RBC #/AREA URNS HPF: ABNORMAL /HPF (ref 0–5)
SODIUM SERPL-SCNC: 138 MMOL/L (ref 136–145)
SP GR UR REFRACTOMETRY: 1.02 (ref 1–1.03)
TRIGL SERPL-MCNC: 116 MG/DL
TSH SERPL DL<=0.05 MIU/L-ACNC: 1.35 UIU/ML (ref 0.36–3.74)
UROBILINOGEN UR QL STRIP.AUTO: 0.2 EU/DL (ref 0.2–1)
VLDLC SERPL CALC-MCNC: 23.2 MG/DL
WBC URNS QL MICRO: ABNORMAL /HPF (ref 0–4)

## 2023-09-29 ENCOUNTER — OFFICE VISIT (OUTPATIENT)
Age: 68
End: 2023-09-29
Payer: MEDICARE

## 2023-09-29 VITALS
DIASTOLIC BLOOD PRESSURE: 85 MMHG | TEMPERATURE: 97.9 F | SYSTOLIC BLOOD PRESSURE: 150 MMHG | OXYGEN SATURATION: 98 % | BODY MASS INDEX: 25.34 KG/M2 | WEIGHT: 143 LBS | RESPIRATION RATE: 18 BRPM | HEIGHT: 63 IN | HEART RATE: 70 BPM

## 2023-09-29 DIAGNOSIS — I10 PRIMARY HYPERTENSION: Primary | ICD-10-CM

## 2023-09-29 DIAGNOSIS — Z79.899 ENCOUNTER FOR LONG-TERM (CURRENT) USE OF MEDICATIONS: ICD-10-CM

## 2023-09-29 DIAGNOSIS — Z23 ENCOUNTER FOR IMMUNIZATION: ICD-10-CM

## 2023-09-29 DIAGNOSIS — N32.81 OVERACTIVE BLADDER: ICD-10-CM

## 2023-09-29 PROCEDURE — 90694 VACC AIIV4 NO PRSRV 0.5ML IM: CPT | Performed by: FAMILY MEDICINE

## 2023-09-29 PROCEDURE — PBSHW INFLUENZA, FLUAD, (AGE 65 Y+), IM, PF, 0.5 ML: Performed by: FAMILY MEDICINE

## 2023-09-29 PROCEDURE — 3078F DIAST BP <80 MM HG: CPT | Performed by: FAMILY MEDICINE

## 2023-09-29 PROCEDURE — 3077F SYST BP >= 140 MM HG: CPT | Performed by: FAMILY MEDICINE

## 2023-09-29 PROCEDURE — 99213 OFFICE O/P EST LOW 20 MIN: CPT | Performed by: FAMILY MEDICINE

## 2023-09-29 PROCEDURE — 1123F ACP DISCUSS/DSCN MKR DOCD: CPT | Performed by: FAMILY MEDICINE

## 2023-09-29 RX ORDER — LOSARTAN POTASSIUM 25 MG/1
25 TABLET ORAL EVERY EVENING
Qty: 30 TABLET | Refills: 5 | Status: SHIPPED | OUTPATIENT
Start: 2023-09-29 | End: 2023-09-29 | Stop reason: SDUPTHER

## 2023-09-29 RX ORDER — OXYBUTYNIN CHLORIDE 5 MG/1
5 TABLET, EXTENDED RELEASE ORAL EVERY EVENING
Qty: 30 TABLET | Refills: 5 | Status: SHIPPED | OUTPATIENT
Start: 2023-09-29

## 2023-09-29 RX ORDER — LOSARTAN POTASSIUM 25 MG/1
25 TABLET ORAL EVERY EVENING
Qty: 30 TABLET | Refills: 5 | Status: SHIPPED | OUTPATIENT
Start: 2023-09-29

## 2023-09-29 SDOH — ECONOMIC STABILITY: FOOD INSECURITY: WITHIN THE PAST 12 MONTHS, YOU WORRIED THAT YOUR FOOD WOULD RUN OUT BEFORE YOU GOT MONEY TO BUY MORE.: NEVER TRUE

## 2023-09-29 SDOH — ECONOMIC STABILITY: FOOD INSECURITY: WITHIN THE PAST 12 MONTHS, THE FOOD YOU BOUGHT JUST DIDN'T LAST AND YOU DIDN'T HAVE MONEY TO GET MORE.: NEVER TRUE

## 2023-09-29 SDOH — ECONOMIC STABILITY: INCOME INSECURITY: HOW HARD IS IT FOR YOU TO PAY FOR THE VERY BASICS LIKE FOOD, HOUSING, MEDICAL CARE, AND HEATING?: NOT HARD AT ALL

## 2023-09-29 ASSESSMENT — ENCOUNTER SYMPTOMS
BLOOD IN STOOL: 0
CONSTIPATION: 0
RHINORRHEA: 0
ABDOMINAL PAIN: 0
CHEST TIGHTNESS: 0
BACK PAIN: 0
COUGH: 0
SHORTNESS OF BREATH: 0

## 2023-09-29 ASSESSMENT — PATIENT HEALTH QUESTIONNAIRE - PHQ9
SUM OF ALL RESPONSES TO PHQ QUESTIONS 1-9: 0
SUM OF ALL RESPONSES TO PHQ9 QUESTIONS 1 & 2: 0
SUM OF ALL RESPONSES TO PHQ QUESTIONS 1-9: 0
2. FEELING DOWN, DEPRESSED OR HOPELESS: 0
SUM OF ALL RESPONSES TO PHQ QUESTIONS 1-9: 0
SUM OF ALL RESPONSES TO PHQ QUESTIONS 1-9: 0
1. LITTLE INTEREST OR PLEASURE IN DOING THINGS: 0

## 2023-09-29 NOTE — PROGRESS NOTES
Subjective:      Patient ID: Crystal Malcolm is a 78 y.o. female. HPI  Follow up on chronic medical problems. Overall feeling well. Hypertension follow up:  Pt has htn and tolerating the amlodipine. She is not exercising due to her recent back surgery. Is adherent to a low-salt diet. Patient denies: chest pain, dyspnea, fatigue, lower extremity edema, near-syncope, and palpitations. Cardiovascular risk factors: advanced age (older than 54 for men, 72 for women). Use of agents associated with hypertension: none. History of target organ damage: none. HM:  Mammo: 7/24/2023  CLN: 8/16/2023  repeat in 3 years.      Past Medical History:   Diagnosis Date    Hyperlipidemia     Hypertension     Lumbar stenosis      Past Surgical History:   Procedure Laterality Date    COLONOSCOPY      COLONOSCOPY N/A 8/16/2023    COLONOSCOPY performed by Esperanza Romero MD at hospitals ENDOSCOPY    LUMBAR FUSION N/A 6/13/2023    L4-5 LAMINECTOMY/FUSION INSTRUMENTATION/TRANSFORAMINAL LUMBAR INTERBODY FUSION/BONE GRAFT performed by Mackenzie Mendes MD at [address] [place] [address] Right     right ring finger surgery     Current Outpatient Medications   Medication Sig Dispense Refill    amLODIPine (NORVASC) 5 MG tablet Take 2 tablets by mouth every evening 60 tablet 5    Omega-3 Fatty Acids (FISH OIL) 1000 MG capsule Take 1 capsule by mouth daily      magnesium (MAGNESIUM-OXIDE) 250 MG TABS tablet Take 1 tablet by mouth daily      Cholecalciferol (VITAMIN D3) 50 MCG (2000 UT) CAPS Take 1 capsule by mouth daily      CRANBERRY PO Take 2 tablets by mouth daily 650 mg      CALCIUM PO Take 1 tablet by mouth in the morning and at bedtime citracal      Cyanocobalamin (VITAMIN B12 PO) Take 1 tablet by mouth daily 2500 mg      TURMERIC PO Take 2 tablets by mouth daily      Ascorbic Acid (VITAMIN C PO) Take 1 tablet by mouth daily      B COMPLEX VITAMINS ER PO Take 1 tablet by mouth daily      Misc Natural Products (GLUCOSAMINE-CHONDROITIN SULF

## 2023-09-29 NOTE — PROGRESS NOTES
Chief Complaint   Patient presents with    Follow-up       1. \"Have you been to the ER, urgent care clinic since your last visit? Hospitalized since your last visit? \" No    2. \"Have you seen or consulted any other health care providers outside of the [address] [place] [address] since your last visit? \" No     3. For patients aged 43-73: Has the patient had a colonoscopy / FIT/ Cologuard? Yes      If the patient is female:    4. For patients aged 43-66: Has the patient had a mammogram within the past 2 years? Yes      5. For patients aged 21-65: Has the patient had a pap smear?  Yes     Health Maintenance Due   Topic Date Due    COVID-19 Vaccine (6 - Pfizer series) 04/27/2023    Flu vaccine (1) 08/01/2023

## 2023-10-30 ENCOUNTER — OFFICE VISIT (OUTPATIENT)
Age: 68
End: 2023-10-30
Payer: MEDICARE

## 2023-10-30 VITALS
OXYGEN SATURATION: 94 % | SYSTOLIC BLOOD PRESSURE: 133 MMHG | WEIGHT: 143.8 LBS | HEIGHT: 63 IN | DIASTOLIC BLOOD PRESSURE: 65 MMHG | RESPIRATION RATE: 18 BRPM | TEMPERATURE: 98.2 F | HEART RATE: 76 BPM | BODY MASS INDEX: 25.48 KG/M2

## 2023-10-30 DIAGNOSIS — R73.02 IGT (IMPAIRED GLUCOSE TOLERANCE): ICD-10-CM

## 2023-10-30 DIAGNOSIS — I10 PRIMARY HYPERTENSION: Primary | ICD-10-CM

## 2023-10-30 DIAGNOSIS — E78.2 MIXED HYPERLIPIDEMIA: ICD-10-CM

## 2023-10-30 DIAGNOSIS — N30.90 CYSTITIS: ICD-10-CM

## 2023-10-30 DIAGNOSIS — N32.81 OVERACTIVE BLADDER: ICD-10-CM

## 2023-10-30 DIAGNOSIS — Z79.899 ENCOUNTER FOR LONG-TERM (CURRENT) USE OF MEDICATIONS: ICD-10-CM

## 2023-10-30 LAB
ANION GAP SERPL CALC-SCNC: 6 MMOL/L (ref 5–15)
APPEARANCE UR: CLEAR
BACTERIA URNS QL MICRO: NEGATIVE /HPF
BILIRUB UR QL: NEGATIVE
BUN SERPL-MCNC: 13 MG/DL (ref 6–20)
BUN/CREAT SERPL: 24 (ref 12–20)
CALCIUM SERPL-MCNC: 8.7 MG/DL (ref 8.5–10.1)
CAOX CRY URNS QL MICRO: ABNORMAL
CHLORIDE SERPL-SCNC: 104 MMOL/L (ref 97–108)
CO2 SERPL-SCNC: 27 MMOL/L (ref 21–32)
COLOR UR: ABNORMAL
CREAT SERPL-MCNC: 0.54 MG/DL (ref 0.55–1.02)
EPITH CASTS URNS QL MICRO: ABNORMAL /LPF
GLUCOSE SERPL-MCNC: 107 MG/DL (ref 65–100)
GLUCOSE UR STRIP.AUTO-MCNC: NEGATIVE MG/DL
HGB UR QL STRIP: NEGATIVE
KETONES UR QL STRIP.AUTO: NEGATIVE MG/DL
LEUKOCYTE ESTERASE UR QL STRIP.AUTO: NEGATIVE
NITRITE UR QL STRIP.AUTO: NEGATIVE
PH UR STRIP: 6 (ref 5–8)
POTASSIUM SERPL-SCNC: 3.8 MMOL/L (ref 3.5–5.1)
PROT UR STRIP-MCNC: NEGATIVE MG/DL
RBC #/AREA URNS HPF: ABNORMAL /HPF (ref 0–5)
SODIUM SERPL-SCNC: 137 MMOL/L (ref 136–145)
SP GR UR REFRACTOMETRY: 1.02 (ref 1–1.03)
UROBILINOGEN UR QL STRIP.AUTO: 0.2 EU/DL (ref 0.2–1)
WBC URNS QL MICRO: ABNORMAL /HPF (ref 0–4)

## 2023-10-30 PROCEDURE — 3075F SYST BP GE 130 - 139MM HG: CPT | Performed by: FAMILY MEDICINE

## 2023-10-30 PROCEDURE — 99214 OFFICE O/P EST MOD 30 MIN: CPT | Performed by: FAMILY MEDICINE

## 2023-10-30 PROCEDURE — 3078F DIAST BP <80 MM HG: CPT | Performed by: FAMILY MEDICINE

## 2023-10-30 PROCEDURE — 1123F ACP DISCUSS/DSCN MKR DOCD: CPT | Performed by: FAMILY MEDICINE

## 2023-10-30 RX ORDER — NITROFURANTOIN 25; 75 MG/1; MG/1
100 CAPSULE ORAL 2 TIMES DAILY
Qty: 10 CAPSULE | Refills: 0 | Status: SHIPPED | OUTPATIENT
Start: 2023-10-30 | End: 2023-11-04

## 2023-10-30 RX ORDER — OXYBUTYNIN CHLORIDE 5 MG/1
5 TABLET, EXTENDED RELEASE ORAL EVERY EVENING
Qty: 30 TABLET | Refills: 5 | Status: SHIPPED | OUTPATIENT
Start: 2023-10-30

## 2023-10-30 SDOH — ECONOMIC STABILITY: FOOD INSECURITY: WITHIN THE PAST 12 MONTHS, YOU WORRIED THAT YOUR FOOD WOULD RUN OUT BEFORE YOU GOT MONEY TO BUY MORE.: NEVER TRUE

## 2023-10-30 SDOH — ECONOMIC STABILITY: FOOD INSECURITY: WITHIN THE PAST 12 MONTHS, THE FOOD YOU BOUGHT JUST DIDN'T LAST AND YOU DIDN'T HAVE MONEY TO GET MORE.: NEVER TRUE

## 2023-10-30 SDOH — ECONOMIC STABILITY: INCOME INSECURITY: HOW HARD IS IT FOR YOU TO PAY FOR THE VERY BASICS LIKE FOOD, HOUSING, MEDICAL CARE, AND HEATING?: NOT HARD AT ALL

## 2023-10-30 ASSESSMENT — ENCOUNTER SYMPTOMS
COUGH: 0
CHEST TIGHTNESS: 0
BACK PAIN: 0
SHORTNESS OF BREATH: 0
RHINORRHEA: 0
CONSTIPATION: 0
BLOOD IN STOOL: 0
ABDOMINAL PAIN: 0

## 2023-10-30 ASSESSMENT — PATIENT HEALTH QUESTIONNAIRE - PHQ9
SUM OF ALL RESPONSES TO PHQ QUESTIONS 1-9: 0
2. FEELING DOWN, DEPRESSED OR HOPELESS: 0
SUM OF ALL RESPONSES TO PHQ QUESTIONS 1-9: 0
SUM OF ALL RESPONSES TO PHQ9 QUESTIONS 1 & 2: 0
1. LITTLE INTEREST OR PLEASURE IN DOING THINGS: 0

## 2023-10-30 NOTE — PROGRESS NOTES
Subjective:      Patient ID: Norris Corral is a 78 y.o. female. HPI  Follow up on chronic medical problems. Overall feeling well. Hypertension follow up:  Pt has htn and hypercholesterolemia and tolerating the amlodipine and losartan. She is not exercising due to her recent back surgery. Is adherent to a low-salt diet. Patient denies: chest pain, dyspnea, fatigue, lower extremity edema, near-syncope, and palpitations. Cardiovascular risk factors: advanced age (older than 54 for men, 72 for women). Use of agents associated with hypertension: none. History of target organ damage: none. Glucose intolerance reveiw:  She has IGT. Last a1c at 5.8%. Diabetic ROS - further diabetic ROS: no polyuria or polydipsia, no chest pain, dyspnea or TIA's, no numbness, tingling or pain in extremities, no unusual visual symptoms. Lab review: orders written for new lab studies as appropriate; see orders. HM:  Mammo: 7/24/2023  CLN: 8/16/2023  repeat in 3 years.      Past Medical History:   Diagnosis Date    Hyperlipidemia     Hypertension     Lumbar stenosis      Past Surgical History:   Procedure Laterality Date    COLONOSCOPY      COLONOSCOPY N/A 8/16/2023    COLONOSCOPY performed by Dez Altamirano MD at [facility] ENDOSCOPY    LUMBAR FUSION N/A 6/13/2023    L4-5 LAMINECTOMY/FUSION INSTRUMENTATION/TRANSFORAMINAL LUMBAR INTERBODY FUSION/BONE GRAFT performed by Pilar Robert MD at [address] [place] [address] Right     right ring finger surgery     Current Outpatient Medications   Medication Sig Dispense Refill    oxybutynin (DITROPAN XL) 5 MG extended release tablet Take 1 tablet by mouth every evening For overactive bladder 30 tablet 5    losartan (COZAAR) 25 MG tablet Take 1 tablet by mouth every evening Take for blood pressure 30 tablet 5    amLODIPine (NORVASC) 5 MG tablet Take 2 tablets by mouth every evening 60 tablet 5    Omega-3 Fatty Acids (FISH OIL) 1000 MG capsule Take 1 capsule by mouth daily

## 2023-10-30 NOTE — PROGRESS NOTES
Chief Complaint   Patient presents with    Follow-up       1. \"Have you been to the ER, urgent care clinic since your last visit? Hospitalized since your last visit? \" No    2. \"Have you seen or consulted any other health care providers outside of the [address] [place] [address] since your last visit? \" No     3. For patients aged 43-73: Has the patient had a colonoscopy / FIT/ Cologuard? Yes      If the patient is female:    4. For patients aged 43-66: Has the patient had a mammogram within the past 2 years? Yes      5. For patients aged 21-65: Has the patient had a pap smear?  Yes     Health Maintenance Due   Topic Date Due    COVID-19 Vaccine (6 - Pfizer series) 04/27/2023

## 2023-11-01 LAB
BACTERIA SPEC CULT: NORMAL
CC UR VC: NORMAL
SERVICE CMNT-IMP: NORMAL

## 2024-02-14 DIAGNOSIS — I10 PRIMARY HYPERTENSION: ICD-10-CM

## 2024-02-15 RX ORDER — LOSARTAN POTASSIUM 25 MG/1
25 TABLET ORAL NIGHTLY
Qty: 30 TABLET | Refills: 0 | Status: SHIPPED | OUTPATIENT
Start: 2024-02-15

## 2024-02-15 NOTE — TELEPHONE ENCOUNTER
Last appointment: 10/30/23  Next appointment: 3/4/24  Previous refill encounter(s): 9/29/23 #30 with 5 refills    Requested Prescriptions     Pending Prescriptions Disp Refills    losartan (COZAAR) 25 MG tablet [Pharmacy Med Name: LOSARTAN 25 MG TAB[*]] 30 tablet 0     Sig: TAKE ONE TABLET BY MOUTH EVERY EVENING         For Pharmacy Admin Tracking Only    Program: Medication Refill  CPA in place:    Recommendation Provided To:   Intervention Detail: New Rx: 1, reason: Patient Preference  Intervention Accepted By:   Gap Closed?:    Time Spent (min): 5

## 2024-02-19 DIAGNOSIS — I10 PRIMARY HYPERTENSION: ICD-10-CM

## 2024-02-20 RX ORDER — AMLODIPINE BESYLATE 5 MG/1
TABLET ORAL
Qty: 180 TABLET | Refills: 3 | Status: SHIPPED | OUTPATIENT
Start: 2024-02-20

## 2024-02-20 NOTE — TELEPHONE ENCOUNTER
Last appointment: 10/30/23  Next appointment: 3/4/24  Previous refill encounter(s): 8/30/23 #60 with 5 refills    Requested Prescriptions     Pending Prescriptions Disp Refills    amLODIPine (NORVASC) 5 MG tablet [Pharmacy Med Name: AMLODIPINE 5 MG TAB[*]] 180 tablet 3     Sig: TAKE TWO TABLETS BY MOUTH EVERY EVENING         For Pharmacy Admin Tracking Only    Program: Medication Refill  CPA in place:    Recommendation Provided To:   Intervention Detail: New Rx: 1, reason: Patient Preference  Intervention Accepted By:   Gap Closed?:    Time Spent (min): 5

## 2024-03-04 ENCOUNTER — OFFICE VISIT (OUTPATIENT)
Age: 69
End: 2024-03-04
Payer: MEDICARE

## 2024-03-04 VITALS
SYSTOLIC BLOOD PRESSURE: 134 MMHG | HEIGHT: 63 IN | BODY MASS INDEX: 26.22 KG/M2 | WEIGHT: 148 LBS | TEMPERATURE: 97.7 F | OXYGEN SATURATION: 96 % | RESPIRATION RATE: 18 BRPM | DIASTOLIC BLOOD PRESSURE: 82 MMHG | HEART RATE: 66 BPM

## 2024-03-04 DIAGNOSIS — Z79.899 ENCOUNTER FOR LONG-TERM (CURRENT) USE OF MEDICATIONS: ICD-10-CM

## 2024-03-04 DIAGNOSIS — E78.2 MIXED HYPERLIPIDEMIA: ICD-10-CM

## 2024-03-04 DIAGNOSIS — I10 PRIMARY HYPERTENSION: Primary | ICD-10-CM

## 2024-03-04 DIAGNOSIS — N32.81 OVERACTIVE BLADDER: ICD-10-CM

## 2024-03-04 DIAGNOSIS — R73.02 IGT (IMPAIRED GLUCOSE TOLERANCE): ICD-10-CM

## 2024-03-04 LAB
ALBUMIN SERPL-MCNC: 3.4 G/DL (ref 3.5–5)
ALBUMIN/GLOB SERPL: 0.9 (ref 1.1–2.2)
ALP SERPL-CCNC: 97 U/L (ref 45–117)
ALT SERPL-CCNC: 32 U/L (ref 12–78)
ANION GAP SERPL CALC-SCNC: 0 MMOL/L (ref 5–15)
AST SERPL-CCNC: 22 U/L (ref 15–37)
BILIRUB SERPL-MCNC: 0.5 MG/DL (ref 0.2–1)
BUN SERPL-MCNC: 12 MG/DL (ref 6–20)
BUN/CREAT SERPL: 21 (ref 12–20)
CALCIUM SERPL-MCNC: 9.3 MG/DL (ref 8.5–10.1)
CHLORIDE SERPL-SCNC: 107 MMOL/L (ref 97–108)
CHOLEST SERPL-MCNC: 291 MG/DL
CO2 SERPL-SCNC: 29 MMOL/L (ref 21–32)
CREAT SERPL-MCNC: 0.56 MG/DL (ref 0.55–1.02)
ERYTHROCYTE [DISTWIDTH] IN BLOOD BY AUTOMATED COUNT: 14.2 % (ref 11.5–14.5)
GLOBULIN SER CALC-MCNC: 3.7 G/DL (ref 2–4)
GLUCOSE SERPL-MCNC: 119 MG/DL (ref 65–100)
GLUCOSE, POC: 121 MG/DL
HBA1C MFR BLD: 6 %
HCT VFR BLD AUTO: 40.8 % (ref 35–47)
HDLC SERPL-MCNC: 105 MG/DL
HDLC SERPL: 2.8 (ref 0–5)
HGB BLD-MCNC: 13.4 G/DL (ref 11.5–16)
LDLC SERPL CALC-MCNC: 151 MG/DL (ref 0–100)
MCH RBC QN AUTO: 30.5 PG (ref 26–34)
MCHC RBC AUTO-ENTMCNC: 32.8 G/DL (ref 30–36.5)
MCV RBC AUTO: 92.9 FL (ref 80–99)
NRBC # BLD: 0 K/UL (ref 0–0.01)
NRBC BLD-RTO: 0 PER 100 WBC
PLATELET # BLD AUTO: 250 K/UL (ref 150–400)
PMV BLD AUTO: 10 FL (ref 8.9–12.9)
POTASSIUM SERPL-SCNC: 4.1 MMOL/L (ref 3.5–5.1)
PROT SERPL-MCNC: 7.1 G/DL (ref 6.4–8.2)
RBC # BLD AUTO: 4.39 M/UL (ref 3.8–5.2)
SODIUM SERPL-SCNC: 136 MMOL/L (ref 136–145)
TRIGL SERPL-MCNC: 175 MG/DL
VLDLC SERPL CALC-MCNC: 35 MG/DL
WBC # BLD AUTO: 4.4 K/UL (ref 3.6–11)

## 2024-03-04 PROCEDURE — PBSHW AMB POC HEMOGLOBIN A1C: Performed by: FAMILY MEDICINE

## 2024-03-04 PROCEDURE — 3078F DIAST BP <80 MM HG: CPT | Performed by: FAMILY MEDICINE

## 2024-03-04 PROCEDURE — 99214 OFFICE O/P EST MOD 30 MIN: CPT | Performed by: FAMILY MEDICINE

## 2024-03-04 PROCEDURE — PBSHW AMB POC GLUCOSE BLOOD, BY GLUCOSE MONITORING DEVICE: Performed by: FAMILY MEDICINE

## 2024-03-04 PROCEDURE — 82962 GLUCOSE BLOOD TEST: CPT | Performed by: FAMILY MEDICINE

## 2024-03-04 PROCEDURE — 1123F ACP DISCUSS/DSCN MKR DOCD: CPT | Performed by: FAMILY MEDICINE

## 2024-03-04 PROCEDURE — 83036 HEMOGLOBIN GLYCOSYLATED A1C: CPT | Performed by: FAMILY MEDICINE

## 2024-03-04 PROCEDURE — 3075F SYST BP GE 130 - 139MM HG: CPT | Performed by: FAMILY MEDICINE

## 2024-03-04 RX ORDER — LOSARTAN POTASSIUM 25 MG/1
25 TABLET ORAL NIGHTLY
Qty: 90 TABLET | Refills: 3 | Status: SHIPPED | OUTPATIENT
Start: 2024-03-04

## 2024-03-04 RX ORDER — AMLODIPINE BESYLATE 5 MG/1
TABLET ORAL
Qty: 180 TABLET | Refills: 3 | Status: SHIPPED | OUTPATIENT
Start: 2024-03-04

## 2024-03-04 ASSESSMENT — PATIENT HEALTH QUESTIONNAIRE - PHQ9
SUM OF ALL RESPONSES TO PHQ QUESTIONS 1-9: 0
SUM OF ALL RESPONSES TO PHQ9 QUESTIONS 1 & 2: 0
2. FEELING DOWN, DEPRESSED OR HOPELESS: 0
SUM OF ALL RESPONSES TO PHQ QUESTIONS 1-9: 0
1. LITTLE INTEREST OR PLEASURE IN DOING THINGS: 0
SUM OF ALL RESPONSES TO PHQ QUESTIONS 1-9: 0
SUM OF ALL RESPONSES TO PHQ QUESTIONS 1-9: 0

## 2024-03-04 ASSESSMENT — ENCOUNTER SYMPTOMS
BLOOD IN STOOL: 0
CHEST TIGHTNESS: 0
RHINORRHEA: 0
ABDOMINAL PAIN: 0
CONSTIPATION: 0
SHORTNESS OF BREATH: 0
COUGH: 0

## 2024-03-04 NOTE — PROGRESS NOTES
Subjective:      Patient ID: Ulices Travis is a 68 y.o. female.    HPI  Follow up on chronic medical problems.  Overall feeling well.    Hypertension follow up:  Pt has htn and hypercholesterolemia and tolerating the amlodipine and losartan.   Patient denies: chest pain, dyspnea, fatigue, lower extremity edema, near-syncope, and palpitations.   Cardiovascular risk factors: advanced age (older than 55 for men, 65 for women).   Use of agents associated with hypertension: none.   History of target organ damage: none.  Glucose intolerance reveiw:  She has IGT.  Last a1c at 5.8%.    Diabetic ROS - further diabetic ROS: no polyuria or polydipsia, no chest pain, dyspnea or TIA's, no numbness, tingling or pain in extremities, no unusual visual symptoms.   Lab review: orders written for new lab studies as appropriate; see orders.     HM:  Mammo: 7/24/2023  CLN: 8/16/2023  repeat in 3 years.     Past Medical History:   Diagnosis Date    Hyperlipidemia     Hypertension     Lumbar stenosis      Past Surgical History:   Procedure Laterality Date    COLONOSCOPY      COLONOSCOPY N/A 8/16/2023    COLONOSCOPY performed by Terrence Bravo MD at [facility] ENDOSCOPY    LUMBAR FUSION N/A 6/13/2023    L4-5 LAMINECTOMY/FUSION INSTRUMENTATION/TRANSFORAMINAL LUMBAR INTERBODY FUSION/BONE GRAFT performed by Jose Puentes MD at [facility] MAIN OR    ORTHOPEDIC SURGERY Right     right ring finger surgery     Current Outpatient Medications   Medication Sig Dispense Refill    amLODIPine (NORVASC) 5 MG tablet TAKE TWO TABLETS BY MOUTH EVERY EVENING 180 tablet 3    losartan (COZAAR) 25 MG tablet TAKE ONE TABLET BY MOUTH EVERY EVENING 30 tablet 0    oxybutynin (DITROPAN XL) 5 MG extended release tablet Take 1 tablet by mouth every evening For overactive bladder 30 tablet 5    Omega-3 Fatty Acids (FISH OIL) 1000 MG capsule Take 1 capsule by mouth daily      magnesium (MAGNESIUM-OXIDE) 250 MG TABS tablet Take 1 tablet by mouth daily      Cholecalciferol (VITAMIN

## 2024-03-04 NOTE — PROGRESS NOTES
Chief Complaint   Patient presents with    Follow-up     4 month       \"Have you been to the ER, urgent care clinic since your last visit?  Hospitalized since your last visit?\"    NO    “Have you seen or consulted any other health care providers outside of [facility] since your last visit?”    NO             Vitals:    24 0810   BP: (!) 142/77   Pulse:    Resp:    Temp:    SpO2:        Health Maintenance Due   Topic Date Due    Respiratory Syncytial Virus (RSV) Pregnant or age 60 yrs+ (1 - 1-dose 60+ series) Never done    COVID-19 Vaccine (2023- season) 2023    Annual Wellness Visit (Medicare Advantage)  Never done        The patient, Ulices Travis, identity was verified by name and .

## 2024-03-07 ENCOUNTER — TELEPHONE (OUTPATIENT)
Age: 69
End: 2024-03-07

## 2024-03-07 RX ORDER — ATORVASTATIN CALCIUM 10 MG/1
10 TABLET, FILM COATED ORAL EVERY EVENING
Qty: 30 TABLET | Refills: 3 | Status: SHIPPED | OUTPATIENT
Start: 2024-03-07

## 2024-06-17 RX ORDER — ATORVASTATIN CALCIUM 10 MG/1
10 TABLET, FILM COATED ORAL NIGHTLY
Qty: 90 TABLET | Refills: 3 | Status: SHIPPED | OUTPATIENT
Start: 2024-06-17

## 2024-06-17 NOTE — TELEPHONE ENCOUNTER
Last appointment: 3/4/24  Next appointment: 7/29/24, 9/4/24  Previous refill encounter(s): 3/7/24 #30 with 3 refills    Requested Prescriptions     Pending Prescriptions Disp Refills    atorvastatin (LIPITOR) 10 MG tablet [Pharmacy Med Name: ATORVASTATIN 10 MG TAB] 90 tablet 3     Sig: TAKE ONE TABLET BY MOUTH EVERY EVENING         For Pharmacy Admin Tracking Only    Program: Medication Refill  CPA in place:    Recommendation Provided To:   Intervention Detail: New Rx: 1, reason: Patient Preference  Intervention Accepted By:   Gap Closed?:    Time Spent (min): 5

## 2024-07-23 ENCOUNTER — APPOINTMENT (OUTPATIENT)
Facility: HOSPITAL | Age: 69
End: 2024-07-23
Payer: MEDICARE

## 2024-07-23 ENCOUNTER — HOSPITAL ENCOUNTER (EMERGENCY)
Facility: HOSPITAL | Age: 69
Discharge: HOME | End: 2024-07-24
Attending: EMERGENCY MEDICINE
Payer: MEDICARE

## 2024-07-23 VITALS
DIASTOLIC BLOOD PRESSURE: 64 MMHG | SYSTOLIC BLOOD PRESSURE: 141 MMHG | TEMPERATURE: 97.5 F | BODY MASS INDEX: 26.22 KG/M2 | WEIGHT: 148 LBS | OXYGEN SATURATION: 98 % | RESPIRATION RATE: 18 BRPM | HEIGHT: 63 IN | HEART RATE: 85 BPM

## 2024-07-23 DIAGNOSIS — S92.332A CLOSED DISPLACED FRACTURE OF THIRD METATARSAL BONE OF LEFT FOOT, INITIAL ENCOUNTER: ICD-10-CM

## 2024-07-23 DIAGNOSIS — S92.345A CLOSED NONDISPLACED FRACTURE OF FOURTH METATARSAL BONE OF LEFT FOOT, INITIAL ENCOUNTER: Primary | ICD-10-CM

## 2024-07-23 DIAGNOSIS — S90.32XA TRAUMATIC ECCHYMOSIS OF LEFT FOOT, INITIAL ENCOUNTER: ICD-10-CM

## 2024-07-23 PROCEDURE — 73630 X-RAY EXAM OF FOOT: CPT

## 2024-07-23 PROCEDURE — 6370000000 HC RX 637 (ALT 250 FOR IP): Performed by: EMERGENCY MEDICINE

## 2024-07-23 PROCEDURE — 99283 EMERGENCY DEPT VISIT LOW MDM: CPT

## 2024-07-23 RX ORDER — OXYCODONE HYDROCHLORIDE AND ACETAMINOPHEN 5; 325 MG/1; MG/1
1 TABLET ORAL EVERY 6 HOURS PRN
Qty: 12 TABLET | Refills: 0 | Status: SHIPPED | OUTPATIENT
Start: 2024-07-23 | End: 2024-07-26

## 2024-07-23 RX ORDER — OXYCODONE HYDROCHLORIDE AND ACETAMINOPHEN 5; 325 MG/1; MG/1
1 TABLET ORAL
Status: COMPLETED | OUTPATIENT
Start: 2024-07-23 | End: 2024-07-23

## 2024-07-23 RX ADMIN — OXYCODONE HYDROCHLORIDE AND ACETAMINOPHEN 1 TABLET: 5; 325 TABLET ORAL at 23:34

## 2024-07-24 ASSESSMENT — ENCOUNTER SYMPTOMS
NAUSEA: 0
SHORTNESS OF BREATH: 0
VOMITING: 0
ABDOMINAL PAIN: 0
DIARRHEA: 0

## 2024-07-24 NOTE — ED PROVIDER NOTES
EMERGENCY DEPARTMENT HISTORY AND PHYSICAL EXAM     ----------------------------------------------------------------------------  Please note that this dictation was completed with [employer], the computer voice recognition software.  Quite often unanticipated grammatical, syntax, homophones, and other interpretive errors are inadvertently transcribed by the computer software.  Please disregard these errors.  Please excuse any errors that have escaped final proofreading  ----------------------------------------------------------------------------      Date: 7/23/2024  Patient Name: Ulices Travis      HISTORY OF PRESENT ILLNESS     Chief Complaint   Patient presents with    Foot Pain     Patient ambulatory to triage with c/o of Left Foot Pain, patient reports she twisted her left ankle on Sunday. Patients left foot has bruising noted to all of her toes and down the side of her foot. Patient reports she did not hit her head or fall completely down when twisting her ankle.        History obtainted from:  Patient    Other independent source of history: Family    HPI: Ulices Travis is a 68 y.o. female, with significant pmhx of Hypertension, cholesterol, lumbar stenosis, who presents via private vehicle  to the ED with c/o left foot pain and bruising.  Patient reports that she tripped and fell 2 days ago causing pain to her left foot.  Has been ambulating on this foot since that time.  Attempted to take over-the-counter medications to alleviate her symptoms manage but notes only minor relief.  Patient with increased bruising and swelling today prompting her to come to the ER for further evaluation.  Denies other associated injury associate with the fall including loss of consciousness.      PCP: Tanesha Lorenz MD    Allergy List:   No Known Allergies      CURRENT MEDICATIONS      Discharge Medication List as of 7/23/2024 11:52 PM        CONTINUE these medications which have NOT CHANGED    Details   atorvastatin (LIPITOR) 10

## 2024-07-24 NOTE — DISCHARGE INSTRUCTIONS
It was a pleasure taking care of you in our Emergency Department today.  We know that when you come to [facility], you are entrusting us with your health, comfort, and safety.  Our physicians and nurses honor that trust, and truly appreciate the opportunity to care for you and your loved ones.      We also value your feedback.  If you receive a survey about your Emergency Department experience today, please fill it out.  We care about our patients' feedback, and we listen to what you have to say.  Thank you!      Dr. Alba Michaud MD.

## 2024-07-24 NOTE — ED NOTES
Pt denied the crutches after MD spoke with orthopedics and was informed that the patient can do weight bearing with just a boot. Pt discharged ambulatory at this time with just a boot

## 2024-07-29 ENCOUNTER — TELEPHONE (OUTPATIENT)
Age: 69
End: 2024-07-29

## 2024-07-29 ENCOUNTER — OFFICE VISIT (OUTPATIENT)
Age: 69
End: 2024-07-29
Payer: MEDICARE

## 2024-07-29 VITALS
HEART RATE: 72 BPM | OXYGEN SATURATION: 97 % | WEIGHT: 148.6 LBS | HEIGHT: 63 IN | RESPIRATION RATE: 18 BRPM | SYSTOLIC BLOOD PRESSURE: 129 MMHG | DIASTOLIC BLOOD PRESSURE: 74 MMHG | BODY MASS INDEX: 26.33 KG/M2 | TEMPERATURE: 98 F

## 2024-07-29 DIAGNOSIS — N32.81 OVERACTIVE BLADDER: ICD-10-CM

## 2024-07-29 DIAGNOSIS — R73.02 IGT (IMPAIRED GLUCOSE TOLERANCE): ICD-10-CM

## 2024-07-29 DIAGNOSIS — I10 PRIMARY HYPERTENSION: Primary | ICD-10-CM

## 2024-07-29 DIAGNOSIS — S92.902S CLOSED FRACTURE OF LEFT FOOT, SEQUELA: ICD-10-CM

## 2024-07-29 DIAGNOSIS — E78.2 MIXED HYPERLIPIDEMIA: ICD-10-CM

## 2024-07-29 DIAGNOSIS — Z79.899 ENCOUNTER FOR LONG-TERM (CURRENT) USE OF MEDICATIONS: ICD-10-CM

## 2024-07-29 DIAGNOSIS — Z12.31 ENCOUNTER FOR SCREENING MAMMOGRAM FOR BREAST CANCER: ICD-10-CM

## 2024-07-29 LAB
ALBUMIN SERPL-MCNC: 3.6 G/DL (ref 3.5–5)
ALBUMIN/GLOB SERPL: 0.9 (ref 1.1–2.2)
ALP SERPL-CCNC: 101 U/L (ref 45–117)
ALT SERPL-CCNC: 39 U/L (ref 12–78)
ANION GAP SERPL CALC-SCNC: 4 MMOL/L (ref 5–15)
AST SERPL-CCNC: 20 U/L (ref 15–37)
BILIRUB SERPL-MCNC: 0.5 MG/DL (ref 0.2–1)
BUN SERPL-MCNC: 17 MG/DL (ref 6–20)
BUN/CREAT SERPL: 29 (ref 12–20)
CALCIUM SERPL-MCNC: 9.4 MG/DL (ref 8.5–10.1)
CHLORIDE SERPL-SCNC: 105 MMOL/L (ref 97–108)
CHOLEST SERPL-MCNC: 246 MG/DL
CO2 SERPL-SCNC: 30 MMOL/L (ref 21–32)
CREAT SERPL-MCNC: 0.58 MG/DL (ref 0.55–1.02)
GLOBULIN SER CALC-MCNC: 3.8 G/DL (ref 2–4)
GLUCOSE SERPL-MCNC: 116 MG/DL (ref 65–100)
GLUCOSE, POC: 112 MG/DL
HBA1C MFR BLD: 5.7 %
HDLC SERPL-MCNC: 121 MG/DL
HDLC SERPL: 2 (ref 0–5)
LDLC SERPL CALC-MCNC: 104.4 MG/DL (ref 0–100)
POTASSIUM SERPL-SCNC: 4.1 MMOL/L (ref 3.5–5.1)
PROT SERPL-MCNC: 7.4 G/DL (ref 6.4–8.2)
SODIUM SERPL-SCNC: 139 MMOL/L (ref 136–145)
TRIGL SERPL-MCNC: 103 MG/DL
VLDLC SERPL CALC-MCNC: 20.6 MG/DL

## 2024-07-29 PROCEDURE — PBSHW AMB POC GLUCOSE BLOOD, BY GLUCOSE MONITORING DEVICE: Performed by: FAMILY MEDICINE

## 2024-07-29 PROCEDURE — PBSHW AMB POC HEMOGLOBIN A1C: Performed by: FAMILY MEDICINE

## 2024-07-29 PROCEDURE — 3074F SYST BP LT 130 MM HG: CPT | Performed by: FAMILY MEDICINE

## 2024-07-29 PROCEDURE — 82962 GLUCOSE BLOOD TEST: CPT | Performed by: FAMILY MEDICINE

## 2024-07-29 PROCEDURE — 99214 OFFICE O/P EST MOD 30 MIN: CPT | Performed by: FAMILY MEDICINE

## 2024-07-29 PROCEDURE — 1123F ACP DISCUSS/DSCN MKR DOCD: CPT | Performed by: FAMILY MEDICINE

## 2024-07-29 PROCEDURE — 3078F DIAST BP <80 MM HG: CPT | Performed by: FAMILY MEDICINE

## 2024-07-29 PROCEDURE — 83036 HEMOGLOBIN GLYCOSYLATED A1C: CPT | Performed by: FAMILY MEDICINE

## 2024-07-29 RX ORDER — TRAMADOL HYDROCHLORIDE 50 MG/1
50 TABLET ORAL EVERY 8 HOURS PRN
Qty: 20 TABLET | Refills: 0 | Status: SHIPPED | OUTPATIENT
Start: 2024-07-29 | End: 2024-08-28

## 2024-07-29 ASSESSMENT — ENCOUNTER SYMPTOMS
ABDOMINAL PAIN: 0
SHORTNESS OF BREATH: 0
CONSTIPATION: 0
COUGH: 0
RHINORRHEA: 0
BLOOD IN STOOL: 0
CHEST TIGHTNESS: 0

## 2024-07-29 ASSESSMENT — PATIENT HEALTH QUESTIONNAIRE - PHQ9
SUM OF ALL RESPONSES TO PHQ QUESTIONS 1-9: 1
SUM OF ALL RESPONSES TO PHQ QUESTIONS 1-9: 1
2. FEELING DOWN, DEPRESSED OR HOPELESS: SEVERAL DAYS
SUM OF ALL RESPONSES TO PHQ QUESTIONS 1-9: 1
SUM OF ALL RESPONSES TO PHQ9 QUESTIONS 1 & 2: 1
SUM OF ALL RESPONSES TO PHQ QUESTIONS 1-9: 1
1. LITTLE INTEREST OR PLEASURE IN DOING THINGS: NOT AT ALL

## 2024-07-29 NOTE — PROGRESS NOTES
Subjective:      Patient ID: Ulices Travis is a 68 y.o. female.    HPI  Follow up on chronic medical problems.  Overall feeling well.  Fell in her garage a 7/21/2024 and twisted her foot and suffered a fracture in the foot.  Was seen in the ER on 7/22.  Her ortho appt is not until 7/31.  She is in walking boot.  Given rx for oxycodone but this made her since.  Wants to get something else for pain until she is seen by ortho.    Hypertension follow up:  Pt has htn and hypercholesterolemia and tolerating the amlodipine and losartan.   Patient denies: chest pain, dyspnea, fatigue, lower extremity edema, near-syncope, and palpitations.   Cardiovascular risk factors: advanced age (older than 55 for men, 65 for women).   Use of agents associated with hypertension: none.   History of target organ damage: none.  Glucose intolerance reveiw:  She has IGT.  Last a1c at 5.8%.    Diabetic ROS - further diabetic ROS: no polyuria or polydipsia, no chest pain, dyspnea or TIA's, no numbness, tingling or pain in extremities, no unusual visual symptoms.   Lab review: orders written for new lab studies as appropriate; see orders.     HM:  Mammo: 7/24/2023  CLN: 8/16/2023  repeat in 3 years.     Past Medical History:   Diagnosis Date    Hyperlipidemia     Hypertension     Lumbar stenosis      Past Surgical History:   Procedure Laterality Date    COLONOSCOPY      COLONOSCOPY N/A 8/16/2023    COLONOSCOPY performed by Terrence Bravo MD at [facility] ENDOSCOPY    LUMBAR FUSION N/A 6/13/2023    L4-5 LAMINECTOMY/FUSION INSTRUMENTATION/TRANSFORAMINAL LUMBAR INTERBODY FUSION/BONE GRAFT performed by Jose Puentes MD at [facility] MAIN OR    ORTHOPEDIC SURGERY Right     right ring finger surgery     Current Outpatient Medications   Medication Sig Dispense Refill    atorvastatin (LIPITOR) 10 MG tablet TAKE ONE TABLET BY MOUTH EVERY EVENING 90 tablet 3    amLODIPine (NORVASC) 5 MG tablet TAKE TWO TABLETS BY MOUTH EVERY EVENING 180 tablet 3    losartan (COZAAR) 25

## 2024-07-29 NOTE — PROGRESS NOTES
Chief Complaint   Patient presents with    Follow-up     Patient is here today for diabetes check.       \"Have you been to the ER, urgent care clinic since your last visit?  Hospitalized since your last visit?\"    YES, [facility] for broken foot .    “Have you seen or consulted any other health care providers outside of [facility] since your last visit?”    NO       Have you had a mammogram?”   Patient states she is schedule but unsure of the date.    Date of last Mammogram: 2023           Vitals:    24 0859   BP: 129/74   Pulse: 72   Resp: 18   Temp: 98 °F (36.7 °C)   SpO2: 97%       Health Maintenance Due   Topic Date Due    Annual Wellness Visit (Medicare Advantage)  Never done    Breast cancer screen  2024        The patient, Ulices Travis, identity was verified by name and .

## 2024-07-29 NOTE — TELEPHONE ENCOUNTER
Spoke with patient regarding her , advised her that Dr. Mckenzie has sent in a lotion to apply to his scalp. Advised her the Nizoral shampoo can be picked up OTC. Patient understood.

## 2024-09-23 ENCOUNTER — TELEPHONE (OUTPATIENT)
Age: 69
End: 2024-09-23

## 2024-09-23 RX ORDER — DICLOFENAC SODIUM 75 MG/1
75 TABLET, DELAYED RELEASE ORAL 2 TIMES DAILY PRN
Qty: 30 TABLET | Refills: 0 | Status: SHIPPED | OUTPATIENT
Start: 2024-09-23

## 2024-09-23 RX ORDER — CYCLOBENZAPRINE HCL 10 MG
10 TABLET ORAL NIGHTLY PRN
Qty: 20 TABLET | Refills: 0 | Status: SHIPPED | OUTPATIENT
Start: 2024-09-23

## 2024-10-03 DIAGNOSIS — N32.81 OVERACTIVE BLADDER: ICD-10-CM

## 2024-10-03 RX ORDER — OXYBUTYNIN CHLORIDE 5 MG/1
TABLET, EXTENDED RELEASE ORAL
Qty: 90 TABLET | Refills: 3 | Status: SHIPPED | OUTPATIENT
Start: 2024-10-03

## 2024-10-03 NOTE — TELEPHONE ENCOUNTER
Last appointment: 7/29/24  Next appointment: 11/11/24  Previous refill encounter(s): 10/30/23 #30 with 5 refills    Requested Prescriptions     Pending Prescriptions Disp Refills    oxyBUTYnin (DITROPAN-XL) 5 MG extended release tablet [Pharmacy Med Name: OXYBUTYNIN CL ER 5 MG TAB[*]] 90 tablet 3     Sig: TAKE ONE TABLET BY MOUTH EVERY EVENING FOR OVERACTIVE BLADDER         For Pharmacy Admin Tracking Only    Program: Medication Refill  CPA in place:    Recommendation Provided To:   Intervention Detail: New Rx: 1, reason: Patient Preference  Intervention Accepted By:   Gap Closed?:    Time Spent (min): 5

## 2025-02-12 ENCOUNTER — TELEPHONE (OUTPATIENT)
Age: 70
End: 2025-02-12

## 2025-02-12 NOTE — TELEPHONE ENCOUNTER
Attempted to contact patient regarding upcoming Medicare wellness appointment and completion of HRA questionnaire. LVM for patient to please return call at  [phone].

## 2025-02-13 NOTE — TELEPHONE ENCOUNTER
Attempted to contact patient regarding upcoming Medicare wellness appointment and completion of HRA questionnaire. LVM for patient to please return call at  [phone].

## 2025-02-14 SDOH — ECONOMIC STABILITY: INCOME INSECURITY: IN THE LAST 12 MONTHS, WAS THERE A TIME WHEN YOU WERE NOT ABLE TO PAY THE MORTGAGE OR RENT ON TIME?: NO

## 2025-02-14 SDOH — ECONOMIC STABILITY: TRANSPORTATION INSECURITY
IN THE PAST 12 MONTHS, HAS THE LACK OF TRANSPORTATION KEPT YOU FROM MEDICAL APPOINTMENTS OR FROM GETTING MEDICATIONS?: NO

## 2025-02-14 SDOH — ECONOMIC STABILITY: FOOD INSECURITY: WITHIN THE PAST 12 MONTHS, YOU WORRIED THAT YOUR FOOD WOULD RUN OUT BEFORE YOU GOT MONEY TO BUY MORE.: NEVER TRUE

## 2025-02-14 SDOH — ECONOMIC STABILITY: FOOD INSECURITY: WITHIN THE PAST 12 MONTHS, THE FOOD YOU BOUGHT JUST DIDN'T LAST AND YOU DIDN'T HAVE MONEY TO GET MORE.: NEVER TRUE

## 2025-02-14 SDOH — ECONOMIC STABILITY: TRANSPORTATION INSECURITY
IN THE PAST 12 MONTHS, HAS LACK OF TRANSPORTATION KEPT YOU FROM MEETINGS, WORK, OR FROM GETTING THINGS NEEDED FOR DAILY LIVING?: NO

## 2025-02-14 SDOH — HEALTH STABILITY: PHYSICAL HEALTH: ON AVERAGE, HOW MANY DAYS PER WEEK DO YOU ENGAGE IN MODERATE TO STRENUOUS EXERCISE (LIKE A BRISK WALK)?: 0 DAYS

## 2025-02-14 ASSESSMENT — LIFESTYLE VARIABLES
HOW OFTEN DO YOU HAVE A DRINK CONTAINING ALCOHOL: NEVER
HOW MANY STANDARD DRINKS CONTAINING ALCOHOL DO YOU HAVE ON A TYPICAL DAY: 0
HOW MANY STANDARD DRINKS CONTAINING ALCOHOL DO YOU HAVE ON A TYPICAL DAY: PATIENT DOES NOT DRINK
HOW OFTEN DO YOU HAVE SIX OR MORE DRINKS ON ONE OCCASION: 1
HOW OFTEN DO YOU HAVE A DRINK CONTAINING ALCOHOL: 1

## 2025-02-14 ASSESSMENT — PATIENT HEALTH QUESTIONNAIRE - PHQ9
2. FEELING DOWN, DEPRESSED OR HOPELESS: NOT AT ALL
SUM OF ALL RESPONSES TO PHQ QUESTIONS 1-9: 0
SUM OF ALL RESPONSES TO PHQ QUESTIONS 1-9: 0
1. LITTLE INTEREST OR PLEASURE IN DOING THINGS: NOT AT ALL
SUM OF ALL RESPONSES TO PHQ QUESTIONS 1-9: 0
SUM OF ALL RESPONSES TO PHQ QUESTIONS 1-9: 0
SUM OF ALL RESPONSES TO PHQ9 QUESTIONS 1 & 2: 0

## 2025-02-17 ENCOUNTER — OFFICE VISIT (OUTPATIENT)
Age: 70
End: 2025-02-17
Payer: MEDICARE

## 2025-02-17 VITALS
SYSTOLIC BLOOD PRESSURE: 152 MMHG | BODY MASS INDEX: 26.75 KG/M2 | HEART RATE: 68 BPM | DIASTOLIC BLOOD PRESSURE: 74 MMHG | RESPIRATION RATE: 20 BRPM | TEMPERATURE: 98.2 F | HEIGHT: 63 IN | OXYGEN SATURATION: 96 % | WEIGHT: 151 LBS

## 2025-02-17 DIAGNOSIS — Z12.31 ENCOUNTER FOR SCREENING MAMMOGRAM FOR BREAST CANCER: ICD-10-CM

## 2025-02-17 DIAGNOSIS — N32.81 OVERACTIVE BLADDER: ICD-10-CM

## 2025-02-17 DIAGNOSIS — I10 PRIMARY HYPERTENSION: ICD-10-CM

## 2025-02-17 DIAGNOSIS — Z00.00 MEDICARE ANNUAL WELLNESS VISIT, SUBSEQUENT: Primary | ICD-10-CM

## 2025-02-17 DIAGNOSIS — R73.02 IGT (IMPAIRED GLUCOSE TOLERANCE): ICD-10-CM

## 2025-02-17 DIAGNOSIS — E78.2 MIXED HYPERLIPIDEMIA: ICD-10-CM

## 2025-02-17 DIAGNOSIS — R82.90 NONSPECIFIC FINDINGS ON EXAMINATION OF URINE: ICD-10-CM

## 2025-02-17 DIAGNOSIS — Z79.899 ENCOUNTER FOR LONG-TERM (CURRENT) USE OF MEDICATIONS: ICD-10-CM

## 2025-02-17 LAB
ALBUMIN SERPL-MCNC: 3.8 G/DL (ref 3.5–5)
ALBUMIN/GLOB SERPL: 1.1 (ref 1.1–2.2)
ALP SERPL-CCNC: 97 U/L (ref 45–117)
ALT SERPL-CCNC: 53 U/L (ref 12–78)
ANION GAP SERPL CALC-SCNC: 7 MMOL/L (ref 2–12)
AST SERPL-CCNC: 32 U/L (ref 15–37)
BILIRUB SERPL-MCNC: 0.3 MG/DL (ref 0.2–1)
BUN SERPL-MCNC: 15 MG/DL (ref 6–20)
BUN/CREAT SERPL: 35 (ref 12–20)
CALCIUM SERPL-MCNC: 9.4 MG/DL (ref 8.5–10.1)
CHLORIDE SERPL-SCNC: 104 MMOL/L (ref 97–108)
CHOLEST SERPL-MCNC: 255 MG/DL
CO2 SERPL-SCNC: 27 MMOL/L (ref 21–32)
CREAT SERPL-MCNC: 0.43 MG/DL (ref 0.55–1.02)
ERYTHROCYTE [DISTWIDTH] IN BLOOD BY AUTOMATED COUNT: 14.7 % (ref 11.5–14.5)
GLOBULIN SER CALC-MCNC: 3.5 G/DL (ref 2–4)
GLUCOSE SERPL-MCNC: 105 MG/DL (ref 65–100)
GLUCOSE, POC: 126 MG/DL
HBA1C MFR BLD: 5.9 %
HCT VFR BLD AUTO: 41.5 % (ref 35–47)
HDLC SERPL-MCNC: 127 MG/DL
HDLC SERPL: 2 (ref 0–5)
HGB BLD-MCNC: 13.6 G/DL (ref 11.5–16)
LDLC SERPL CALC-MCNC: 101.6 MG/DL (ref 0–100)
MCH RBC QN AUTO: 31.3 PG (ref 26–34)
MCHC RBC AUTO-ENTMCNC: 32.8 G/DL (ref 30–36.5)
MCV RBC AUTO: 95.4 FL (ref 80–99)
NRBC # BLD: 0 K/UL (ref 0–0.01)
NRBC BLD-RTO: 0 PER 100 WBC
PLATELET # BLD AUTO: 225 K/UL (ref 150–400)
PMV BLD AUTO: 10.7 FL (ref 8.9–12.9)
POTASSIUM SERPL-SCNC: 4 MMOL/L (ref 3.5–5.1)
PROT SERPL-MCNC: 7.3 G/DL (ref 6.4–8.2)
RBC # BLD AUTO: 4.35 M/UL (ref 3.8–5.2)
SODIUM SERPL-SCNC: 138 MMOL/L (ref 136–145)
TRIGL SERPL-MCNC: 132 MG/DL
VLDLC SERPL CALC-MCNC: 26.4 MG/DL
WBC # BLD AUTO: 4.1 K/UL (ref 3.6–11)

## 2025-02-17 PROCEDURE — 99214 OFFICE O/P EST MOD 30 MIN: CPT | Performed by: FAMILY MEDICINE

## 2025-02-17 PROCEDURE — 82962 GLUCOSE BLOOD TEST: CPT | Performed by: FAMILY MEDICINE

## 2025-02-17 PROCEDURE — 83036 HEMOGLOBIN GLYCOSYLATED A1C: CPT | Performed by: FAMILY MEDICINE

## 2025-02-17 RX ORDER — LOSARTAN POTASSIUM 25 MG/1
50 TABLET ORAL NIGHTLY
Qty: 180 TABLET | Refills: 3 | Status: SHIPPED | OUTPATIENT
Start: 2025-02-17

## 2025-02-17 ASSESSMENT — ENCOUNTER SYMPTOMS
CONSTIPATION: 0
CHEST TIGHTNESS: 0
COUGH: 0
BLOOD IN STOOL: 0
SHORTNESS OF BREATH: 0
ABDOMINAL PAIN: 0
RHINORRHEA: 0

## 2025-02-17 NOTE — PROGRESS NOTES
Chief Complaint   Patient presents with    Medicare AWV     Patient is here today for AWV.       \"Have you been to the ER, urgent care clinic since your last visit?  Hospitalized since your last visit?\"    NO    “Have you seen or consulted any other health care providers outside of [facility] since your last visit?”    NO    Have you had a mammogram?”   NO    Date of last Mammogram: 2023             Click Here for Release of Records Request       There were no vitals filed for this visit.   Health Maintenance Due   Topic Date Due    Shingles vaccine (2 of 3) 2017    Breast cancer screen  2024    Flu vaccine (1) 2024    COVID-19 Vaccine ( season) 2024    Annual Wellness Visit (Medicare Advantage)  Never done        The patient, Ulices Travis, identity was verified by name and .     
Medicare Annual Wellness Visit    Ulices Travis is here for Medicare AWV (Patient is here today for AWV.)    Assessment & Plan   Medicare annual wellness visit, subsequent  Primary hypertension  -     losartan (COZAAR) 25 MG tablet; Take 2 tablets by mouth nightly, Disp-180 tablet, R-3Normal  Mixed hyperlipidemia  -     Lipid Panel; Future  IGT (impaired glucose tolerance)  -     AMB POC GLUCOSE BLOOD, BY GLUCOSE MONITORING DEVICE  -     AMB POC HEMOGLOBIN A1C  Encounter for long-term (current) use of medications  -     Comprehensive Metabolic Panel; Future  -     CBC; Future  Overactive bladder  -     Urinalysis with Microscopic; Future  -     External Referral To Urology  Nonspecific findings on examination of urine  -     Culture, Urine; Future  Encounter for screening mammogram for breast cancer  -     [facility] SIMA DIGITAL SCREEN BILATERAL; Future       Return in about 1 month (around 3/17/2025).     Subjective   Patient's complete Health Risk Assessment and screening values have been reviewed and are found in Flowsheets. The following problems were reviewed today and where indicated follow up appointments were made and/or referrals ordered.    Positive Risk Factor Screenings with Interventions:              Inactivity:  On average, how many days per week do you engage in moderate to strenuous exercise (like a brisk walk)?: 0 days (!) Abnormal  On average, how many minutes do you engage in exercise at this level?: 0 min  Interventions:  See AVS for additional education material    Poor Eating Habits/Diet:  Do you eat balanced/healthy meals regularly?: (!) No  Interventions:  See AVS for additional education material        Safety:  Do you have working smoke detectors?: (!) No  Interventions:  See AVS for additional education material               Objective   Vitals:    02/17/25 1050 02/17/25 1051   BP: (!) 154/92 (!) 152/74   Pulse: 68    Resp: 20    Temp: 98.2 °F (36.8 °C)    TempSrc: Temporal    SpO2: 96%    Weight: 
  HENT:  Negative for congestion and rhinorrhea.    Respiratory:  Negative for cough, chest tightness and shortness of breath.    Cardiovascular:  Negative for chest pain, palpitations and leg swelling.   Gastrointestinal:  Negative for abdominal pain, blood in stool and constipation.   Endocrine: Negative for polyuria.   Genitourinary:  Positive for frequency (at night but oveall improved with taking the ditropan). Negative for difficulty urinating, hematuria and urgency.   Musculoskeletal:  Negative for arthralgias, joint swelling and myalgias.   Skin:  Negative for rash.   Allergic/Immunologic: Negative for environmental allergies.   Neurological:  Negative for dizziness, light-headedness and headaches.   Psychiatric/Behavioral:  Negative for dysphoric mood and sleep disturbance. The patient is not nervous/anxious.        Objective:   Physical Exam  Constitutional:       Appearance: Normal appearance.      Comments: BP (!) 152/74   Pulse 68   Temp 98.2 °F (36.8 °C) (Temporal)   Resp 20   Ht 1.6 m (5' 3\")   Wt 68.5 kg (151 lb)   SpO2 96%   BMI 26.75 kg/m²    HENT:      Right Ear: Tympanic membrane normal.      Left Ear: Tympanic membrane normal.      Nose: No rhinorrhea.      Mouth/Throat:      Mouth: Mucous membranes are moist.   Cardiovascular:      Rate and Rhythm: Normal rate and regular rhythm.   Pulmonary:      Effort: Pulmonary effort is normal.      Breath sounds: Normal breath sounds.   Abdominal:      General: Bowel sounds are normal.      Palpations: Abdomen is soft.      Tenderness: There is no abdominal tenderness.   Musculoskeletal:         General: Normal range of motion.      Cervical back: Normal range of motion and neck supple.      Right lower leg: No edema.      Left lower leg: No edema.   Lymphadenopathy:      Cervical: No cervical adenopathy.   Skin:     General: Skin is warm and dry.   Neurological:      General: No focal deficit present.      Mental Status: She is alert and oriented

## 2025-02-17 NOTE — PATIENT INSTRUCTIONS
Learning About Being Active as an Older Adult  Why is being active important as you get older?     Being active is one of the best things you can do for your health. And it's never too late to start. Being active--or getting active, if you aren't already--has definite benefits. It can:  Give you more energy,  Keep your mind sharp.  Improve balance to reduce your risk of falls.  Help you manage chronic illness with fewer medicines.  No matter how old you are, how fit you are, or what health problems you have, there is a form of activity that will work for you. And the more physical activity you can do, the better your overall health will be.  What kinds of activity can help you stay healthy?  Being more active will make your daily activities easier. Physical activity includes planned exercise and things you do in daily life. There are four types of activity:  Aerobic.  Doing aerobic activity makes your heart and lungs strong.  Includes walking, dancing, and gardening.  Aim for at least 2½ hours spread throughout the week.  It improves your energy and can help you sleep better.  Muscle-strengthening.  This type of activity can help maintain muscle and strengthen bones.  Includes climbing stairs, using resistance bands, and lifting or carrying heavy loads.  Aim for at least twice a week.  It can help protect the knees and other joints.  Stretching.  Stretching gives you better range of motion in joints and muscles.  Includes upper arm stretches, calf stretches, and gentle yoga.  Aim for at least twice a week, preferably after your muscles are warmed up from other activities.  It can help you function better in daily life.  Balancing.  This helps you stay coordinated and have good posture.  Includes heel-to-toe walking, beatris chi, and certain types of yoga.  Aim for at least 3 days a week.  It can reduce your risk of falling.  Even if you have a hard time meeting the recommendations, it's better to be more active

## 2025-02-18 LAB
APPEARANCE UR: CLEAR
BACTERIA SPEC CULT: NORMAL
BACTERIA URNS QL MICRO: NEGATIVE /HPF
BILIRUB UR QL: NEGATIVE
COLOR UR: NORMAL
EPITH CASTS URNS QL MICRO: NORMAL /LPF
GLUCOSE UR STRIP.AUTO-MCNC: NEGATIVE MG/DL
HGB UR QL STRIP: NEGATIVE
HYALINE CASTS URNS QL MICRO: NORMAL /LPF (ref 0–5)
KETONES UR QL STRIP.AUTO: NEGATIVE MG/DL
LEUKOCYTE ESTERASE UR QL STRIP.AUTO: NEGATIVE
NITRITE UR QL STRIP.AUTO: NEGATIVE
PH UR STRIP: 6 (ref 5–8)
PROT UR STRIP-MCNC: NEGATIVE MG/DL
RBC #/AREA URNS HPF: NORMAL /HPF (ref 0–5)
SERVICE CMNT-IMP: NORMAL
SP GR UR REFRACTOMETRY: 1.01 (ref 1–1.03)
UROBILINOGEN UR QL STRIP.AUTO: 0.2 EU/DL (ref 0.2–1)
WBC URNS QL MICRO: NORMAL /HPF (ref 0–4)

## 2025-03-09 DIAGNOSIS — I10 PRIMARY HYPERTENSION: ICD-10-CM

## 2025-03-10 RX ORDER — LOSARTAN POTASSIUM 25 MG/1
25 TABLET ORAL NIGHTLY
Qty: 90 TABLET | Refills: 3 | OUTPATIENT
Start: 2025-03-10

## 2025-03-10 NOTE — TELEPHONE ENCOUNTER
New Cozaar 50mg/night dose sent on 2/17/25 for #180 with 3 refills    For Pharmacy Admin Tracking Only    Program: Medication Refill  CPA in place:    Recommendation Provided To:   Intervention Detail: Discontinued Rx: 1, reason: Duplicate Therapy  Intervention Accepted By:   Gap Closed?:    Time Spent (min): 5

## 2025-03-25 ENCOUNTER — TELEPHONE (OUTPATIENT)
Age: 70
End: 2025-03-25

## 2025-03-25 RX ORDER — ATORVASTATIN CALCIUM 10 MG/1
10 TABLET, FILM COATED ORAL NIGHTLY
Qty: 90 TABLET | Refills: 3 | Status: SHIPPED | OUTPATIENT
Start: 2025-03-25

## 2025-03-31 ENCOUNTER — OFFICE VISIT (OUTPATIENT)
Age: 70
End: 2025-03-31
Payer: MEDICARE

## 2025-03-31 VITALS
SYSTOLIC BLOOD PRESSURE: 125 MMHG | HEART RATE: 73 BPM | DIASTOLIC BLOOD PRESSURE: 66 MMHG | TEMPERATURE: 98 F | OXYGEN SATURATION: 96 % | BODY MASS INDEX: 27.03 KG/M2 | WEIGHT: 152.6 LBS | RESPIRATION RATE: 18 BRPM

## 2025-03-31 DIAGNOSIS — R73.02 IGT (IMPAIRED GLUCOSE TOLERANCE): ICD-10-CM

## 2025-03-31 DIAGNOSIS — K14.9 TONGUE IRRITATION: ICD-10-CM

## 2025-03-31 DIAGNOSIS — Z12.31 ENCOUNTER FOR SCREENING MAMMOGRAM FOR BREAST CANCER: ICD-10-CM

## 2025-03-31 DIAGNOSIS — Z79.899 ENCOUNTER FOR LONG-TERM (CURRENT) USE OF MEDICATIONS: ICD-10-CM

## 2025-03-31 DIAGNOSIS — N32.81 OVERACTIVE BLADDER: ICD-10-CM

## 2025-03-31 DIAGNOSIS — I10 PRIMARY HYPERTENSION: Primary | ICD-10-CM

## 2025-03-31 DIAGNOSIS — L60.9 FINGERNAIL ABNORMALITIES: ICD-10-CM

## 2025-03-31 DIAGNOSIS — E78.2 MIXED HYPERLIPIDEMIA: ICD-10-CM

## 2025-03-31 PROCEDURE — 3074F SYST BP LT 130 MM HG: CPT | Performed by: FAMILY MEDICINE

## 2025-03-31 PROCEDURE — 1160F RVW MEDS BY RX/DR IN RCRD: CPT | Performed by: FAMILY MEDICINE

## 2025-03-31 PROCEDURE — 1159F MED LIST DOCD IN RCRD: CPT | Performed by: FAMILY MEDICINE

## 2025-03-31 PROCEDURE — 1123F ACP DISCUSS/DSCN MKR DOCD: CPT | Performed by: FAMILY MEDICINE

## 2025-03-31 PROCEDURE — 99214 OFFICE O/P EST MOD 30 MIN: CPT | Performed by: FAMILY MEDICINE

## 2025-03-31 PROCEDURE — 1125F AMNT PAIN NOTED PAIN PRSNT: CPT | Performed by: FAMILY MEDICINE

## 2025-03-31 PROCEDURE — 3078F DIAST BP <80 MM HG: CPT | Performed by: FAMILY MEDICINE

## 2025-03-31 RX ORDER — TROSPIUM CHLORIDE 20 MG/1
20 TABLET, FILM COATED ORAL 2 TIMES DAILY
COMMUNITY
Start: 2025-03-03

## 2025-03-31 RX ORDER — TRIAMCINOLONE ACETONIDE 0.1 %
PASTE (GRAM) DENTAL
Qty: 5 G | Refills: 1 | Status: SHIPPED | OUTPATIENT
Start: 2025-03-31 | End: 2025-04-07

## 2025-03-31 ASSESSMENT — PATIENT HEALTH QUESTIONNAIRE - PHQ9
SUM OF ALL RESPONSES TO PHQ QUESTIONS 1-9: 0
2. FEELING DOWN, DEPRESSED OR HOPELESS: NOT AT ALL
SUM OF ALL RESPONSES TO PHQ QUESTIONS 1-9: 0
SUM OF ALL RESPONSES TO PHQ QUESTIONS 1-9: 0
1. LITTLE INTEREST OR PLEASURE IN DOING THINGS: NOT AT ALL
SUM OF ALL RESPONSES TO PHQ QUESTIONS 1-9: 0

## 2025-03-31 ASSESSMENT — ENCOUNTER SYMPTOMS
CONSTIPATION: 0
SHORTNESS OF BREATH: 0
BLOOD IN STOOL: 0
CHEST TIGHTNESS: 0
COUGH: 0
RHINORRHEA: 0
ABDOMINAL PAIN: 0

## 2025-03-31 NOTE — PROGRESS NOTES
Subjective:      Patient ID: Ulices Travis is a 69 y.o. female.    HPI  Follow up on chronic medical problems.  Overall feeling well.     C/o tongue discomfort over the past 2 to 3 months.  No triggers with eating.  Hurts all the time  for the past few weeks.  Eating does not increase the pain or discomfort.  Feeling like a burning sensation on the right side of the tongue.  Last seen by her dentist on last month but he did not \"see anything\" or do anything for the discomfort.  Pt is non smoker.    Hypertension follow up:  Pt has htn and hypercholesterolemia and tolerating the amlodipine and losartan.   Patient denies: chest pain, dyspnea, fatigue, lower extremity edema, near-syncope, and palpitations.   Cardiovascular risk factors: advanced age (older than 55 for men, 65 for women).   Use of agents associated with hypertension: none.   History of target organ damage: none.  Glucose intolerance reveiw:  She has IGT.  Last a1c at 5.8%.    Diabetic ROS - further diabetic ROS: no polyuria or polydipsia, no chest pain, dyspnea or TIA's, no numbness, tingling or pain in extremities, no unusual visual symptoms.   Lab review: orders written for new lab studies as appropriate; see orders.   Osteoarthritis:  Patient has osteoarthritis.  Still has lower back ain that comes and goes.  C/o increased left knee pain for the past several weeks.  Knee pain comes and goes.  No injury noted.  Has no swelling.  Stiffness after sitting. Has not taken anything for the pain.  Pain is 5-6/10.  Has no popping or giving away of the knee.  Symptoms onset: problem is longstanding.  Rheumatological ROS: stable, mild-to-moderate joint symptoms intermittently, reasonably well controlled by PRN meds.   Response to treatment plan: stable and intermittent.   .  HM:  Mammo: 7/24/2023  CLN: 8/16/2023  repeat in 3 years.     Past Medical History:   Diagnosis Date    Broken foot 2024    left foot    Hyperlipidemia     Hypertension     Lumbar stenosis

## 2025-03-31 NOTE — PROGRESS NOTES
Chief Complaint   Patient presents with    1 Month Follow-Up     Patient is here today for a 1 month follow up.       \"Have you been to the ER, urgent care clinic since your last visit?  Hospitalized since your last visit?\"    NO    “Have you seen or consulted any other health care providers outside of [facility] since your last visit?”    NO    Have you had a mammogram?”   NO    Date of last Mammogram: 2023             Click Here for Release of Records Request       There were no vitals filed for this visit.   Health Maintenance Due   Topic Date Due    Shingles vaccine (2 of 3) 2017    Breast cancer screen  2024        The patient, Ulices Travis, identity was verified by name and .

## 2025-05-21 DIAGNOSIS — I10 PRIMARY HYPERTENSION: ICD-10-CM

## 2025-05-22 RX ORDER — AMLODIPINE BESYLATE 5 MG/1
TABLET ORAL
Qty: 180 TABLET | Refills: 3 | Status: SHIPPED | OUTPATIENT
Start: 2025-05-22

## 2025-05-22 NOTE — TELEPHONE ENCOUNTER
Last appointment: 3/31/25  Next appointment: 9/30/25  Previous refill encounter(s): 3/4/24    Requested Prescriptions     Pending Prescriptions Disp Refills    amLODIPine (NORVASC) 5 MG tablet [Pharmacy Med Name: AMLODIPINE 5 MG TAB[*]] 180 tablet 3     Sig: TAKE TWO TABLETS BY MOUTH EVERY EVENING         For Pharmacy Admin Tracking Only    Program: Medication Refill  CPA in place:    Recommendation Provided To:   Intervention Detail: New Rx: 1, reason: Patient Preference  Intervention Accepted By:   Gap Closed?:    Time Spent (min): 5

## (undated) DEVICE — OPTIFOAM GENTLE SA, POSTOP, 4X10: Brand: MEDLINE

## (undated) DEVICE — FIAPC® PROBE W/ FILTER 2200 C OD 2.3MM/6.9FR; L 2.2M/7.2FT: Brand: ERBE

## (undated) DEVICE — KIT EVAC 400CC DIA1/8IN H PAT 12.5IN 3 SPR RND SHP PVC DRN

## (undated) DEVICE — 4-PORT MANIFOLD: Brand: NEPTUNE 2

## (undated) DEVICE — INJECTION THERAPY NEEDLE CATHETER: Brand: INTERJECT

## (undated) DEVICE — GOWN,SIRUS,NONRNF,SETINSLV,XL,20/CS: Brand: MEDLINE

## (undated) DEVICE — SNARE ENDOSCP L240CM LOOP W27MM SHTH DIA2.4MM WRK CHN 2.8MM

## (undated) DEVICE — CATHETER IV 14GA L1.25IN TEF STR HUB INTROCAN SFTY

## (undated) DEVICE — SNARE ENDOSCP POLYP MED STD AD 2.4X27X240 CM 2.8 MM OVL SENS

## (undated) DEVICE — GLOVE ORANGE PI 7 1/2   MSG9075

## (undated) DEVICE — SUTURE STRATAFIX SPRL SZ 1 L14IN ABSRB VLT L48CM CTX 1/2 SXPD2B405

## (undated) DEVICE — AGENT HEMOSTATIC SURGIFLOW MATRIX KIT W/THROMBIN

## (undated) DEVICE — FORCEPS BX L240CM JAW DIA2.4MM ORNG L CAP W/ NDL DISP RAD

## (undated) DEVICE — GLOVE ORTHO 8   MSG9480

## (undated) DEVICE — Device: Brand: JELCO

## (undated) DEVICE — SUTURE ABSORBABLE 3-0 PS-1 18 IN UD MONOCRYL + STRATAFIX SXMP1B102

## (undated) DEVICE — IV START KIT: Brand: MEDLINE

## (undated) DEVICE — ELECTRODE PT RET AD L9FT HI MOIST COND ADH HYDRGEL CORDED

## (undated) DEVICE — ALCOHOL RUBBING ISO 16OZ 70%

## (undated) DEVICE — CLEANER ES TIP W2XL2IN ADH BK RADPQ FOR S STL ELECTRD

## (undated) DEVICE — GLOVE SURG SZ 7 L12IN FNGR THK79MIL GRN LTX FREE

## (undated) DEVICE — THE MILL DISPOSABLE - MEDIUM

## (undated) DEVICE — ACCY PA100-A LEGEND LUB/DIFFUSER 4 PACK: Brand: MIDAS REX®

## (undated) DEVICE — AEGIS 1" DISK 4MM HOLE, PEEL OPEN: Brand: MEDLINE

## (undated) DEVICE — LAMINECTOMY-SFMC: Brand: MEDLINE INDUSTRIES, INC.

## (undated) DEVICE — CONNEXT SURGICAL MATRIX - LARGE SYRINGE: Brand: CONNEXT SURGICAL MATRIX

## (undated) DEVICE — 3M™ TEGADERM™ TRANSPARENT FILM DRESSING FRAME STYLE, 1624W, 2-3/8 IN X 2-3/4 IN (6 CM X 7 CM), 100/CT 4CT/CASE: Brand: 3M™ TEGADERM™

## (undated) DEVICE — SOLUTION IRRIG 1000ML STRL H2O USP PLAS POUR BTL

## (undated) DEVICE — ENDOSCOPIC KIT COMPLIANCE ENDOKIT

## (undated) DEVICE — CLIP LIG L235CM RESOL 360 BX/20

## (undated) DEVICE — SUTURE VCRL SZ 2-0 L27IN ABSRB UD L26MM CT-2 1/2 CIR J269H

## (undated) DEVICE — SUTURE VCRL SZ 2-0 L36IN ABSRB UD L36MM CT-1 1/2 CIR J945H

## (undated) DEVICE — SINGLE-USE BIOPSY FORCEPS: Brand: RADIAL JAW 4

## (undated) DEVICE — CUFF BLD PRSS AD CLTH SGL TB W/ BAYNT CONN ROUNDED CORNER

## (undated) DEVICE — 1LYRTR 16FR10ML100%SIL UMS SNP: Brand: MEDLINE INDUSTRIES, INC.

## (undated) DEVICE — SOLUTION IRRIG 1000ML 0.9% SOD CHL USP POUR PLAS BTL

## (undated) DEVICE — LIQUIBAND RAPID ADHESIVE 36/CS 0.8ML: Brand: MEDLINE

## (undated) DEVICE — CONTAINER SPEC 20 ML LID NEUT BUFF FORMALIN 10 % POLYPR STS

## (undated) DEVICE — 450 ML BOTTLE OF 0.05% CHLORHEXIDINE GLUCONATE IN 99.95% STERILE WATER FOR IRRIGATION, USP AND APPLICATOR.: Brand: IRRISEPT ANTIMICROBIAL WOUND LAVAGE

## (undated) DEVICE — GLOVE SURG SZ 65 THK91MIL LTX FREE SYN POLYISOPRENE

## (undated) DEVICE — TIP SUCT TRNSPAR RIB SURF STD BLB RIG NVENT W/ 5IN1 CONN DYND50138] MEDLINE INDUSTRIES INC]

## (undated) DEVICE — PROBE BALL TIP STIMULATING 25

## (undated) DEVICE — TRAP ENDOSCP POLYP 2 CHMBR DRAWER TYP

## (undated) DEVICE — 3M™ TEGADERM™ TRANSPARENT FILM DRESSING FRAME STYLE, 1626, 4 IN X 4-3/4 IN (10 CM X 12 CM), 50/CT 4CT/CASE: Brand: 3M™ TEGADERM™

## (undated) DEVICE — SET GRAV CK VLV NEEDLESS ST 3 GANGED 4WAY STPCOCK HI FLO 10

## (undated) DEVICE — GLOVE SURG SZ 8 L12IN FNGR THK79MIL GRN LTX FREE

## (undated) DEVICE — JACKSON TABLE POSITIONER KIT: Brand: MEDLINE INDUSTRIES, INC.

## (undated) DEVICE — STRAP,POSITIONING,KNEE/BODY,FOAM,4X60": Brand: MEDLINE

## (undated) DEVICE — FIAPC® PROBE W/ FILTER 2200 A OD 2.3MM/6.9FR; L 2.2M/7.2FT: Brand: ERBE

## (undated) DEVICE — SUTURE VCRL SZ 1 L36IN ABSRB UD L36MM CT-1 1/2 CIR J947H

## (undated) DEVICE — TOOL 14MH30 LEGEND 14CM 3MM: Brand: MIDAS REX ™

## (undated) DEVICE — SPONGE GZ W4XL4IN COT 12 PLY TYP VII WVN C FLD DSGN STERILE